# Patient Record
Sex: FEMALE | Race: WHITE | Employment: OTHER | URBAN - METROPOLITAN AREA
[De-identification: names, ages, dates, MRNs, and addresses within clinical notes are randomized per-mention and may not be internally consistent; named-entity substitution may affect disease eponyms.]

---

## 2017-09-26 ENCOUNTER — TRANSFERRED RECORDS (OUTPATIENT)
Dept: HEALTH INFORMATION MANAGEMENT | Facility: CLINIC | Age: 82
End: 2017-09-26

## 2017-10-10 ENCOUNTER — TRANSFERRED RECORDS (OUTPATIENT)
Dept: HEALTH INFORMATION MANAGEMENT | Facility: CLINIC | Age: 82
End: 2017-10-10

## 2017-11-21 ENCOUNTER — TRANSFERRED RECORDS (OUTPATIENT)
Dept: HEALTH INFORMATION MANAGEMENT | Facility: CLINIC | Age: 82
End: 2017-11-21

## 2018-08-21 ENCOUNTER — TRANSFERRED RECORDS (OUTPATIENT)
Dept: HEALTH INFORMATION MANAGEMENT | Facility: CLINIC | Age: 83
End: 2018-08-21

## 2018-09-11 NOTE — TELEPHONE ENCOUNTER
FUTURE VISIT INFORMATION      FUTURE VISIT INFORMATION:    Date: 9/17/18    Time: 1330    Location: ORTHO  REFERRAL INFORMATION:    Referring provider:  N/A    Referring providers clinic:  N/A    Reason for visit/diagnosis  KNEE PAIN    RECORDS REQUESTED FROM:       Clinic name Comments Records Status Imaging Status                                         RECORDS STATUS      LVM WITH PATIENT TO DISCUSS RECORDS 9/10/18

## 2018-09-12 NOTE — TELEPHONE ENCOUNTER
RECORDS RECEIVED FROM: [facility]   DATE RECEIVED: 9/12/18   NOTES STATUS DETAILS   OFFICE NOTE from referring provider N/A    OFFICE NOTE from other specialist Received 10/1/17*2/18/18*2/27/18*8/24/18*   DISCHARGE SUMMARY from hospital N/A    DISCHARGE REPORT from the ER N/A    OPERATIVE REPORT N/A    MEDICATION LIST Received    IMPLANT RECORD/STICKER N/A    LABS     CBC/DIFF N/A    CULTURES N/A    INJECTIONS DONE IN RADIOLOGY N/A    MRI N/A    CT SCAN N/A    XRAYS (IMAGES & REPORTS) Received 9/26/17*   TUMOR     PATHOLOGY  Slides & report N/A

## 2018-09-17 ENCOUNTER — RADIANT APPOINTMENT (OUTPATIENT)
Dept: GENERAL RADIOLOGY | Facility: CLINIC | Age: 83
End: 2018-09-17
Attending: ORTHOPAEDIC SURGERY
Payer: MEDICARE

## 2018-09-17 ENCOUNTER — HOSPITAL ENCOUNTER (INPATIENT)
Facility: CLINIC | Age: 83
Setting detail: SURGERY ADMIT
End: 2018-09-17
Attending: ORTHOPAEDIC SURGERY | Admitting: ORTHOPAEDIC SURGERY
Payer: MEDICARE

## 2018-09-17 ENCOUNTER — OFFICE VISIT (OUTPATIENT)
Dept: ORTHOPEDICS | Facility: CLINIC | Age: 83
End: 2018-09-17
Payer: MEDICARE

## 2018-09-17 ENCOUNTER — PRE VISIT (OUTPATIENT)
Dept: ORTHOPEDICS | Facility: CLINIC | Age: 83
End: 2018-09-17

## 2018-09-17 VITALS — WEIGHT: 146.7 LBS | HEIGHT: 62 IN | BODY MASS INDEX: 27 KG/M2

## 2018-09-17 DIAGNOSIS — M25.561 PAIN IN BOTH KNEES, UNSPECIFIED CHRONICITY: ICD-10-CM

## 2018-09-17 DIAGNOSIS — M25.562 BILATERAL KNEE PAIN: ICD-10-CM

## 2018-09-17 DIAGNOSIS — M25.561 BILATERAL KNEE PAIN: ICD-10-CM

## 2018-09-17 DIAGNOSIS — Z96.641 STATUS POST RIGHT HIP REPLACEMENT: Primary | ICD-10-CM

## 2018-09-17 DIAGNOSIS — Z96.641 STATUS POST RIGHT HIP REPLACEMENT: ICD-10-CM

## 2018-09-17 DIAGNOSIS — M25.562 PAIN IN BOTH KNEES, UNSPECIFIED CHRONICITY: ICD-10-CM

## 2018-09-17 PROBLEM — R73.9 ELEVATED BLOOD SUGAR: Status: ACTIVE | Noted: 2018-09-17

## 2018-09-17 PROBLEM — J30.2 SEASONAL ALLERGIES: Status: ACTIVE | Noted: 2018-09-17

## 2018-09-17 LAB
ABO + RH BLD: NORMAL
ABO + RH BLD: NORMAL
BLD GP AB SCN SERPL QL: NORMAL
BLOOD BANK CMNT PATIENT-IMP: NORMAL
SPECIMEN EXP DATE BLD: NORMAL

## 2018-09-17 RX ORDER — UBIDECARENONE 50 MG
1 CAPSULE ORAL
Status: ON HOLD | COMMUNITY
End: 2018-12-02

## 2018-09-17 RX ORDER — AMLODIPINE BESYLATE 5 MG/1
5 TABLET ORAL
COMMUNITY
Start: 2018-02-14

## 2018-09-17 RX ORDER — NIACIN 250 MG
TABLET ORAL
COMMUNITY

## 2018-09-17 RX ORDER — LATANOPROST 50 UG/ML
SOLUTION/ DROPS OPHTHALMIC
COMMUNITY
Start: 2017-02-16

## 2018-09-17 RX ORDER — TRAVOPROST OPHTHALMIC SOLUTION 0.04 MG/ML
SOLUTION OPHTHALMIC
COMMUNITY
Start: 2016-09-08

## 2018-09-17 RX ORDER — PHENOL 1.4 %
AEROSOL, SPRAY (ML) MUCOUS MEMBRANE
COMMUNITY

## 2018-09-17 RX ORDER — CETIRIZINE HYDROCHLORIDE 10 MG/1
10 TABLET ORAL
COMMUNITY

## 2018-09-17 ASSESSMENT — ACTIVITIES OF DAILY LIVING (ADL): FUNCTION,_DAILY_LIVING_SCORE: 39.7

## 2018-09-17 ASSESSMENT — ENCOUNTER SYMPTOMS
BACK PAIN: 0
DECREASED APPETITE: 0
SINUS PAIN: 0
MUSCLE WEAKNESS: 1
SORE THROAT: 0
CHILLS: 0
DEPRESSION: 0
FLANK PAIN: 0
NECK MASS: 0
NIGHT SWEATS: 0
SINUS CONGESTION: 1
FATIGUE: 0
MUSCLE CRAMPS: 0
WEIGHT LOSS: 0
POLYDIPSIA: 0
HYPERTENSION: 1
STIFFNESS: 1
NERVOUS/ANXIOUS: 1
PANIC: 0
INSOMNIA: 1
WEIGHT GAIN: 0
SMELL DISTURBANCE: 0
MYALGIAS: 0
DYSURIA: 0
LEG PAIN: 1
TROUBLE SWALLOWING: 0
EXERCISE INTOLERANCE: 1
PALPITATIONS: 0
DIFFICULTY URINATING: 0
SLEEP DISTURBANCES DUE TO BREATHING: 0
DECREASED CONCENTRATION: 0
FEVER: 0
HALLUCINATIONS: 0
ARTHRALGIAS: 1
NECK PAIN: 0
INCREASED ENERGY: 0
ORTHOPNEA: 0
HEMATURIA: 0
HOARSE VOICE: 0
LIGHT-HEADEDNESS: 0
POLYPHAGIA: 0
SYNCOPE: 0
TASTE DISTURBANCE: 0
ALTERED TEMPERATURE REGULATION: 0
HYPOTENSION: 0
JOINT SWELLING: 1

## 2018-09-17 ASSESSMENT — KOOS JR
HOW SEVERE IS YOUR KNEE STIFFNESS AFTER FIRST WAKING IN MORNING: 1
HOW SEVERE IS YOUR KNEE STIFFNESS AFTER FIRST WAKING IN MORNING: MODERATE

## 2018-09-17 NOTE — PROGRESS NOTES
[facility]  Orthopaedic Surgery, Joint Replacement Consultation  by Beto Jenkins M.D.    Luis Roe MRN# [id number]   Age: 87 year old YOB: 1931     Requesting physician: Los Lambert     Background history:   Dx:  1. 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 s/p prior pelvic acetabular osteotomy reconstruction  3. Bilateral knee degenerative changes  Treatments:   1. 10/30/12 Right total hip arthroplasty & reconstruction of pelvis with femoral autogenous graft using internal fixation (Gregory)  [employer] Trident Tritanium cup 60mm, Size F elevated 36mm liner, Cemented Omnifit femoral stem size 5, 36mm -5 CoCr head  2. 2/2018: right knee injection (several months of relief)  3. 8/2018: left knee injection (minimal relief)         Assessment and Plan:   Assessment:  87 year old female with advanced bilateral knee degenerative changes     Plan:    Discussed bilateral knee degenerative changes with patient; discussed management options including activity modification, anti-inflammatories, injection, and surgical management including total knee arthroplasty.  Patient is failed nonoperative measures and has significant restrictions in her daily activities    Discussed risks and benefits of surgical intervention; risks of total knee arthroplasty include but not limited to infection, blood loss, persistent pain, stiffness, need for further operation, fractures; patient knowledges these risks and wishes to proceed with surgery    Educated patient on the need for home exercises and physical therapy to work on range of motion following surgery    Patient will see her primary care physician to obtain preoperative clearance    Patient to see anesthesia for preoperative evaluation    We will arrange for patient to attend preoperative total joint arthroplasty course    Advised patient to see her dentist and have any dental work completed prior to  undergoing total knee replacement; patient states she has an appointment with her dentist in 3 weeks    We will schedule Ms. Roe for a left total knee arthroplasty pending completion of the above preoperative plan           History of Present Illness:   Ms. Roe is an 87 year old female returning to the office for evaluation of bilateral knee pain; left worse than right.  Patient was previously treated with a right total hip arthroplasty with pelvis reconstruction using autograft bone on 10/30/2012.   Patient has had progressive bilateral knee pain over the last 2-3 years.  She has tried injections in both knees; she got some relief from an injection in the right knee but minimal relief from the injection in the left knee.  Patient states that she has gotten to the point where her daily activity is limited and she is missing regular activities secondary to her knee pain.    Ms. Roe states that her right hip is doing very well since her PENNIE in 2012.    Current symptoms:  Problem: bilateral knee pain  Onset and duration: 2-3 years  Precipitating Injury:  No    Other joints or sites painful:  No         Physical Exam:   Gen: no acute distress  ABD: benign   SKIN: grossly normal   LYMPHATIC: grossly normal   NEURO: grossly normal   VASCULAR: satisfactory perfusion of all extremities   RLE: well healed hip incision, valgus alignment, partially correctable    Knee ROM 0-110 degrees    Thigh and leg compartments soft and compressible    +Quad/TA/GSC/EHL/FHL    SILT DP/SP/albino/Saph/Tibial nerve distributions    Palpable dorsalis pedis pulse  LLE:    Knee ROM 0-110 degrees, varus alignment    Thigh and leg compartments soft and compressible    +Quad/TA/GSC/EHL/FHL    SILT DP/SP/albino/Saph/Tibial nerve distributions    Palpable dorsalis pedis pulse         Data:   Bilateral knee x-rays: Advanced degenerative changes; valgus alignment of right knee with most significant degenerative changes in the lateral  compartment; varus alignment of left knee with significant degenerative changes in the medial compartment.    Pelvis x-rays: Postoperative changes, total hip arthroplasty in place, screw fixation of femoral head bone grafting in place.    Ciro Wilkes MD  Orthopaedic Surgery, Adult Reconstruction Fellow  Pager [phone]    DATA for DOCUMENTATION:         Past Medical History:     Patient Active Problem List   Diagnosis     CDH (congenital dislocation of the hip)     Osteoarthritis of hip     DDH (developmental dysplasia of the hip)     Osteoporosis     Moderate major depression (H)     Hyperlipidemia     Embolism (H)     S/P hip replacement     Past Medical History:   Diagnosis Date     Hyperlipidemia      Osteoporosis      Also see scanned health assessment forms.       Past Surgical History:     Past Surgical History:   Procedure Laterality Date     ARTHROPLASTY HIP  10/30/2012    Procedure: ARTHROPLASTY HIP;  Right Total Hip Replacement;  Surgeon: Beto Jenkins MD;  Location: [facility] OR     HIP SURGERY       HYSTERECTOMY       REPAIR BLADDER       TONSILLECTOMY            Social History:     Social History     Social History     Marital status:      Spouse name: N/A     Number of children: N/A     Years of education: N/A     Occupational History     Not on file.     Social History Main Topics     Smoking status: Former Smoker     Smokeless tobacco: Not on file      Comment: smoked as a teenager     Alcohol use No     Drug use: No     Sexual activity: Not on file     Other Topics Concern     Not on file     Social History Narrative     No narrative on file          Family History:     No family history on file.         Medications:     Current Outpatient Prescriptions   Medication Sig     acetaminophen (TYLENOL) 325 MG tablet Take 1-2 tablets by mouth every 4 hours as needed.     albuterol (PROVENTIL HFA: VENTOLIN HFA) 108 (90 BASE) MCG/ACT inhaler Inhale 2 puffs into the lungs 4 times daily as needed  (dyspnea).     amoxicillin (AMOXIL) 500 MG capsule Take 4 capsules one hour prior to dental work.     amoxicillin (AMOXIL) 500 MG tablet Amoxicillin 2 grams (4 tablets) one half hour before dental visit.     Calcium Carbonate (CALCIUM 600 PO)      fexofenadine (ALLEGRA) 180 MG tablet Take 1 tablet by mouth daily.     Multiple Vitamin (DAILY MULTIVITAMIN PO)      PARoxetine (PAXIL) 10 MG tablet Take 1 tablet by mouth every morning.     No current facility-administered medications for this visit.           Review of Systems:   A comprehensive 10 point review of systems (constitutional, ENT, cardiac, peripheral vascular, lymphatic, respiratory, GI, , Musculoskeletal, skin, Neurological) was performed and found to be negative except as described in this note.     See intake form completed by patient  Answers for HPI/ROS submitted by the patient on 9/17/2018   General Symptoms: Yes  Skin Symptoms: No  HENT Symptoms: Yes  EYE SYMPTOMS: No  HEART SYMPTOMS: Yes  LUNG SYMPTOMS: No  INTESTINAL SYMPTOMS: No  URINARY SYMPTOMS: Yes  GYNECOLOGIC SYMPTOMS: No  BREAST SYMPTOMS: No  SKELETAL SYMPTOMS: Yes  BLOOD SYMPTOMS: No  NERVOUS SYSTEM SYMPTOMS: No  MENTAL HEALTH SYMPTOMS: Yes  Fever: No  Loss of appetite: No  Weight loss: No  Weight gain: No  Fatigue: No  Night sweats: No  Chills: No  Increased stress: Yes  Excessive hunger: No  Excessive thirst: No  Feeling hot or cold when others believe the temperature is normal: No  Loss of height: No  Post-operative complications: No  Surgical site pain: No  Hallucinations: No  Change in or Loss of Energy: No  Hyperactivity: No  Confusion: No  Ear pain: No  Ear discharge: No  Hearing loss: Yes  Tinnitus: No  Nosebleeds: No  Congestion: Yes  Sinus pain: No  Trouble swallowing: No   Voice hoarseness: No  Mouth sores: No  Sore throat: No  Tooth pain: No  Gum tenderness: No  Bleeding gums: No  Change in taste: No  Change in sense of smell: No  Dry mouth: Yes  Hearing aid used: No  Neck  lump: No  Chest pain or pressure: No  Fast or irregular heartbeat: No  Pain in legs with walking: Yes  Trouble breathing while lying down: No  Fingers or toes appear blue: No  High blood pressure: Yes  Low blood pressure: No  Fainting: No  Murmurs: No  Pacemaker: No  Varicose veins: Yes  Edema or swelling: Yes  Wake up at night with shortness of breath: No  Light-headedness: No  Exercise intolerance: Yes  Trouble holding urine or incontinence: Yes  Pain or burning: No  Trouble starting or stopping: No  Increased frequency of urination: Yes  Blood in urine: No  Decreased frequency of urination: No  Frequent nighttime urination: No  Flank pain: No  Difficulty emptying bladder: No  Back pain: No  Muscle aches: No  Neck pain: No  Swollen joints: Yes  Joint pain: Yes  Bone pain: No  Muscle cramps: No  Muscle weakness: Yes  Joint stiffness: Yes  Bone fracture: No  Nervous or Anxious: Yes  Depression: No  Trouble sleeping: Yes  Trouble thinking or concentrating: No  Mood changes: No  Panic attacks: No    Attending MD (Dr. Beto Jenkins) Attestation :  This patient was seen and evaluated by me including a history, exam, and interpretation of all imaging and/or lab data.  Either a training physician (resident/fellow), who also saw the patient, or scribe has documented the clinic visit in the attached note.    Beto Jenkins MD  [employer]  Oncology and Adult Reconstructive Surgery  Dept Orthopaedic Surgery, [facility]  [phone] office, [phone] pager  [email or web address]

## 2018-09-17 NOTE — LETTER
9/17/2018       RE: Luis Roe  [address]  [place] [address]     Dear Colleague,    Thank you for referring your patient, Luis Roe, to the [facility]. Please see a copy of my visit note below.        [facility]  Orthopaedic Surgery, Joint Replacement Consultation  by Beto Jenkins M.D.    Luis Roe MRN# [id number]   Age: 87 year old YOB: 1931     Requesting physician: Los Lambert     Background history:   Dx:  1. 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 s/p prior pelvic acetabular osteotomy reconstruction  3. Bilateral knee degenerative changes  Treatments:   1. 10/30/12 Right total hip arthroplasty & reconstruction of pelvis with femoral autogenous graft using internal fixation (Gregory)  [employer] Trident Tritanium cup 60mm, Size F elevated 36mm liner, Cemented Omnifit femoral stem size 5, 36mm -5 CoCr head  2. 2/2018: right knee injection (several months of relief)  3. 8/2018: left knee injection (minimal relief)         Assessment and Plan:   Assessment:  87 year old female with advanced bilateral knee degenerative changes     Plan:    Discussed bilateral knee degenerative changes with patient; discussed management options including activity modification, anti-inflammatories, injection, and surgical management including total knee arthroplasty.  Patient is failed nonoperative measures and has significant restrictions in her daily activities    Discussed risks and benefits of surgical intervention; risks of total knee arthroplasty include but not limited to infection, blood loss, persistent pain, stiffness, need for further operation, fractures; patient knowledges these risks and wishes to proceed with surgery    Educated patient on the need for home exercises and physical therapy to work on range of motion following surgery    Patient will see her  primary care physician to obtain preoperative clearance    Patient to see anesthesia for preoperative evaluation    We will arrange for patient to attend preoperative total joint arthroplasty course    Advised patient to see her dentist and have any dental work completed prior to undergoing total knee replacement; patient states she has an appointment with her dentist in 3 weeks    We will schedule Ms. Roe for a left total knee arthroplasty pending completion of the above preoperative plan           History of Present Illness:   Ms. Roe is an 87 year old female returning to the office for evaluation of bilateral knee pain; left worse than right.  Patient was previously treated with a right total hip arthroplasty with pelvis reconstruction using autograft bone on 10/30/2012.   Patient has had progressive bilateral knee pain over the last 2-3 years.  She has tried injections in both knees; she got some relief from an injection in the right knee but minimal relief from the injection in the left knee.  Patient states that she has gotten to the point where her daily activity is limited and she is missing regular activities secondary to her knee pain.    Ms. Roe states that her right hip is doing very well since her PENNIE in 2012.    Current symptoms:  Problem: bilateral knee pain  Onset and duration: 2-3 years  Precipitating Injury:  No    Other joints or sites painful:  No         Physical Exam:   Gen: no acute distress  ABD: benign   SKIN: grossly normal   LYMPHATIC: grossly normal   NEURO: grossly normal   VASCULAR: satisfactory perfusion of all extremities   RLE: well healed hip incision, valgus alignment, partially correctable    Knee ROM 0-110 degrees    Thigh and leg compartments soft and compressible    +Quad/TA/GSC/EHL/FHL    SILT DP/SP/albino/Saph/Tibial nerve distributions    Palpable dorsalis pedis pulse  LLE:    Knee ROM 0-110 degrees, varus alignment    Thigh and leg compartments soft and  compressible    +Quad/TA/GSC/EHL/FHL    SILT DP/SP/albino/Saph/Tibial nerve distributions    Palpable dorsalis pedis pulse         Data:   Bilateral knee x-rays: Advanced degenerative changes; valgus alignment of right knee with most significant degenerative changes in the lateral compartment; varus alignment of left knee with significant degenerative changes in the medial compartment.    Pelvis x-rays: Postoperative changes, total hip arthroplasty in place, screw fixation of femoral head bone grafting in place.    Ciro Wilkes MD  Orthopaedic Surgery, Adult Reconstruction Fellow  Pager [phone]    DATA for DOCUMENTATION:         Past Medical History:     Patient Active Problem List   Diagnosis     CDH (congenital dislocation of the hip)     Osteoarthritis of hip     DDH (developmental dysplasia of the hip)     Osteoporosis     Moderate major depression (H)     Hyperlipidemia     Embolism (H)     S/P hip replacement     Past Medical History:   Diagnosis Date     Hyperlipidemia      Osteoporosis      Also see scanned health assessment forms.       Past Surgical History:     Past Surgical History:   Procedure Laterality Date     ARTHROPLASTY HIP  10/30/2012    Procedure: ARTHROPLASTY HIP;  Right Total Hip Replacement;  Surgeon: Beto Jenkins MD;  Location: [facility] OR     HIP SURGERY       HYSTERECTOMY       REPAIR BLADDER       TONSILLECTOMY            Social History:     Social History     Social History     Marital status:      Spouse name: N/A     Number of children: N/A     Years of education: N/A     Occupational History     Not on file.     Social History Main Topics     Smoking status: Former Smoker     Smokeless tobacco: Not on file      Comment: smoked as a teenager     Alcohol use No     Drug use: No     Sexual activity: Not on file     Other Topics Concern     Not on file     Social History Narrative     No narrative on file          Family History:     No family history on file.         Medications:      Current Outpatient Prescriptions   Medication Sig     acetaminophen (TYLENOL) 325 MG tablet Take 1-2 tablets by mouth every 4 hours as needed.     albuterol (PROVENTIL HFA: VENTOLIN HFA) 108 (90 BASE) MCG/ACT inhaler Inhale 2 puffs into the lungs 4 times daily as needed (dyspnea).     amoxicillin (AMOXIL) 500 MG capsule Take 4 capsules one hour prior to dental work.     amoxicillin (AMOXIL) 500 MG tablet Amoxicillin 2 grams (4 tablets) one half hour before dental visit.     Calcium Carbonate (CALCIUM 600 PO)      fexofenadine (ALLEGRA) 180 MG tablet Take 1 tablet by mouth daily.     Multiple Vitamin (DAILY MULTIVITAMIN PO)      PARoxetine (PAXIL) 10 MG tablet Take 1 tablet by mouth every morning.     No current facility-administered medications for this visit.           Review of Systems:   A comprehensive 10 point review of systems (constitutional, ENT, cardiac, peripheral vascular, lymphatic, respiratory, GI, , Musculoskeletal, skin, Neurological) was performed and found to be negative except as described in this note.     See intake form completed by patient  Answers for HPI/ROS submitted by the patient on 9/17/2018   General Symptoms: Yes  Skin Symptoms: No  HENT Symptoms: Yes  EYE SYMPTOMS: No  HEART SYMPTOMS: Yes  LUNG SYMPTOMS: No  INTESTINAL SYMPTOMS: No  URINARY SYMPTOMS: Yes  GYNECOLOGIC SYMPTOMS: No  BREAST SYMPTOMS: No  SKELETAL SYMPTOMS: Yes  BLOOD SYMPTOMS: No  NERVOUS SYSTEM SYMPTOMS: No  MENTAL HEALTH SYMPTOMS: Yes  Fever: No  Loss of appetite: No  Weight loss: No  Weight gain: No  Fatigue: No  Night sweats: No  Chills: No  Increased stress: Yes  Excessive hunger: No  Excessive thirst: No  Feeling hot or cold when others believe the temperature is normal: No  Loss of height: No  Post-operative complications: No  Surgical site pain: No  Hallucinations: No  Change in or Loss of Energy: No  Hyperactivity: No  Confusion: No  Ear pain: No  Ear discharge: No  Hearing loss: Yes  Tinnitus:  No  Nosebleeds: No  Congestion: Yes  Sinus pain: No  Trouble swallowing: No   Voice hoarseness: No  Mouth sores: No  Sore throat: No  Tooth pain: No  Gum tenderness: No  Bleeding gums: No  Change in taste: No  Change in sense of smell: No  Dry mouth: Yes  Hearing aid used: No  Neck lump: No  Chest pain or pressure: No  Fast or irregular heartbeat: No  Pain in legs with walking: Yes  Trouble breathing while lying down: No  Fingers or toes appear blue: No  High blood pressure: Yes  Low blood pressure: No  Fainting: No  Murmurs: No  Pacemaker: No  Varicose veins: Yes  Edema or swelling: Yes  Wake up at night with shortness of breath: No  Light-headedness: No  Exercise intolerance: Yes  Trouble holding urine or incontinence: Yes  Pain or burning: No  Trouble starting or stopping: No  Increased frequency of urination: Yes  Blood in urine: No  Decreased frequency of urination: No  Frequent nighttime urination: No  Flank pain: No  Difficulty emptying bladder: No  Back pain: No  Muscle aches: No  Neck pain: No  Swollen joints: Yes  Joint pain: Yes  Bone pain: No  Muscle cramps: No  Muscle weakness: Yes  Joint stiffness: Yes  Bone fracture: No  Nervous or Anxious: Yes  Depression: No  Trouble sleeping: Yes  Trouble thinking or concentrating: No  Mood changes: No  Panic attacks: No    Attending MD (Dr. Beto Jenkins) Attestation :  This patient was seen and evaluated by me including a history, exam, and interpretation of all imaging and/or lab data.  Either a training physician (resident/fellow), who also saw the patient, or scribe has documented the clinic visit in the attached note.    Beto Jenkins MD  [employer]  Oncology and Adult Reconstructive Surgery  Dept Orthopaedic Surgery, [facility]  [phone] office, [phone] pager  [email or web address]

## 2018-09-17 NOTE — NURSING NOTE
"Chief Complaint   Patient presents with     Consult     Pt. states that she is returning today for Left Knee Pain.        87 year old  8/7/1931    Ht 1.575 m (5' 2\")  Wt 66.5 kg (146 lb 11.2 oz)  BMI 26.83 kg/m2            [id number] [place], [place] - [address] ([facility] [place], [place] - [address]    Allergies   Allergen Reactions     Ace Inhibitors      Other reaction(s): Cough     No Clinical Screening - See Comments      Dust and mold: Nasal      Current Outpatient Prescriptions   Medication     amLODIPine (NORVASC) 5 MG tablet     ascorbic acid 1000 MG TABS tablet     Boswellia Jp (BOSWELLIA PO)     calcium carbonate (OS-ANGELINA 600 MG [place]. CA) 600 MG tablet     cetirizine (RA CETIRIZINE) 10 MG tablet     cholecalciferol (VITAMIN D-1000 MAX ST) 1000 units TABS     IBUPROFEN PO     latanoprost (XALATAN) 0.005 % ophthalmic solution     Multiple Vitamin (DAILY MULTIVITAMIN PO)     niacin 250 MG tablet     Omega-3 Fatty Acids (FISH OIL PO)     Red Yeast Rice 600 MG TABS     travoprost, FRANCISCO Free, (TRAVATAN Z) 0.004 % ophthalmic solution     amoxicillin (AMOXIL) 500 MG capsule     No current facility-administered medications for this visit.          Questionnaires:      KOOS Knee Survey Assessment    Knee Outcome Survey ADL Scale (Prudencio, SHAGUFTA; Diana, L; Pratibha, RS; Santino, FH; Isai, JUAN; 1998) 9/17/2018   Do you have swelling in your knee? Always   Do you feel grinding, hear clicking or any other type of noise when your knee moves? Sometimes   Does your knee catch or hang up when moving? Never   Can you straighten your knee fully? Often   Can you bend your knee fully? Sometimes   How severe is your knee joint stiffness after first wakening in the morning? Moderate   How severe is your knee stiffness after sitting, lying or resting LATER IN THE DAY? Moderate   How often do you experience knee pain? Daily "   Twisting/pivoting on your knee Severe   Straightening knee fully Moderate   Bending knee fully Severe   Walking on flat surface Moderate   Going up or down stairs Severe   At night while in bed Mild   Sitting or lying Mild   Standing upright Severe   Descending stairs Severe   Ascending stairs Moderate   Rising from sitting Moderate   Standing Severe   Bending to floor/ an object Moderate   Walking on flat surface Moderate   Getting in/out of car Severe   Going shopping Moderate   Putting on socks/stockings Severe   Rising from bed Moderate   Taking off socks/stockings Moderate   Lying in bed (turning over, maintaining knee position) Moderate   Getting in/out of bath Severe   Sitting Moderate   Getting on/off toilet Moderate   Heavy domestic duties (moving heavy boxes, scrubbing floors, etc) Extreme   Light domestic duties (cooking, dusting, etc) Moderate   Squatting Severe   Running Extreme   Jumping Extreme   Twisting/pivoting on your injured knee Extreme   Kneeling Extreme   How often are you aware of your knee problem? Constantly   Have you modified your life style to avoid potentially damaging activities to your knee? Totally   How much are you troubled with lack of confidence in your knee? Totally   In general, how much difficulty do you have with your knee? Severe   Pain Score 41.66   Symptoms Score 46.42   Function, Daily Living Score 39.7   Sports and Rec Score 5   Quality of Life Score 6.25            Promis 10 Assessment    PROMIS 10 9/17/2018   In general, would you say your health is: Good   In general, would you say your quality of life is: Fair   In general, how would you rate your physical health? Good   In general, how would you rate your mental health, including your mood and your ability to think? Very good   In general, how would you rate your satisfaction with your social activities and relationships? Good   In general, please rate how well you carry out your usual social activities and  roles Good   To what extent are you able to carry out your everyday physical activities such as walking, climbing stairs, carrying groceries, or moving a chair? Mostly   How often have you been bothered by emotional problems such as feeling anxious, depressed or irritable? Rarely   How would you rate your fatigue on average? Moderate   How would you rate your pain on average?   0 = No Pain  to  10 = Worst Imaginable Pain 5   In general, would you say your health is: 3   In general, would you say your quality of life is: 2   In general, how would you rate your physical health? 3   In general, how would you rate your mental health, including your mood and your ability to think? 4   In general, how would you rate your satisfaction with your social activities and relationships? 3   In general, please rate how well you carry out your usual social activities and roles. (This includes activities at home, at work and in your community, and responsibilities as a parent, child, spouse, employee, friend, etc.) 3   To what extent are you able to carry out your everyday physical activities such as walking, climbing stairs, carrying groceries, or moving a chair? 4   In the past 7 days, how often have you been bothered by emotional problems such as feeling anxious, depressed, or irritable? 2   In the past 7 days, how would you rate your fatigue on average? 3   In the past 7 days, how would you rate your pain on average, where 0 means no pain, and 10 means worst imaginable pain? 5   Global Mental Health Score 13   Global Physical Health Score 13   PROMIS TOTAL - SUBSCORES 26   Some recent data might be hidden

## 2018-09-17 NOTE — LETTER
9/17/2018      RE: Luis Roe  [address] [facility] [place] [facility] [address]           [facility]  Orthopaedic Surgery, Joint Replacement Consultation  by Beto Jenkins M.D.    Luis Roe MRN# [id number]   Age: 87 year old YOB: 1931     Requesting physician: Los Lambert     Background history:   Dx:  1. 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 s/p prior pelvic acetabular osteotomy reconstruction  3. Bilateral knee degenerative changes  Treatments:   1. 10/30/12 Right total hip arthroplasty & reconstruction of pelvis with femoral autogenous graft using internal fixation (Gregory)  [employer] Trident Tritanium cup 60mm, Size F elevated 36mm liner, Cemented Omnifit femoral stem size 5, 36mm -5 CoCr head  2. 2/2018: right knee injection (several months of relief)  3. 8/2018: left knee injection (minimal relief)         Assessment and Plan:   Assessment:  87 year old female with advanced bilateral knee degenerative changes     Plan:    Discussed bilateral knee degenerative changes with patient; discussed management options including activity modification, anti-inflammatories, injection, and surgical management including total knee arthroplasty.  Patient is failed nonoperative measures and has significant restrictions in her daily activities    Discussed risks and benefits of surgical intervention; risks of total knee arthroplasty include but not limited to infection, blood loss, persistent pain, stiffness, need for further operation, fractures; patient knowledges these risks and wishes to proceed with surgery    Educated patient on the need for home exercises and physical therapy to work on range of motion following surgery    Patient will see her primary care physician to obtain preoperative clearance    Patient to see anesthesia for preoperative evaluation    We will arrange for patient to attend preoperative total joint arthroplasty  course    Advised patient to see her dentist and have any dental work completed prior to undergoing total knee replacement; patient states she has an appointment with her dentist in 3 weeks    We will schedule Ms. Roe for a left total knee arthroplasty pending completion of the above preoperative plan           History of Present Illness:   Ms. Roe is an 87 year old female returning to the office for evaluation of bilateral knee pain; left worse than right.  Patient was previously treated with a right total hip arthroplasty with pelvis reconstruction using autograft bone on 10/30/2012.   Patient has had progressive bilateral knee pain over the last 2-3 years.  She has tried injections in both knees; she got some relief from an injection in the right knee but minimal relief from the injection in the left knee.  Patient states that she has gotten to the point where her daily activity is limited and she is missing regular activities secondary to her knee pain.    Ms. Roe states that her right hip is doing very well since her PENNIE in 2012.    Current symptoms:  Problem: bilateral knee pain  Onset and duration: 2-3 years  Precipitating Injury:  No    Other joints or sites painful:  No         Physical Exam:   Gen: no acute distress  ABD: benign   SKIN: grossly normal   LYMPHATIC: grossly normal   NEURO: grossly normal   VASCULAR: satisfactory perfusion of all extremities   RLE: well healed hip incision, valgus alignment, partially correctable    Knee ROM 0-110 degrees    Thigh and leg compartments soft and compressible    +Quad/TA/GSC/EHL/FHL    SILT DP/SP/albino/Saph/Tibial nerve distributions    Palpable dorsalis pedis pulse  LLE:    Knee ROM 0-110 degrees, varus alignment    Thigh and leg compartments soft and compressible    +Quad/TA/GSC/EHL/FHL    SILT DP/SP/albino/Saph/Tibial nerve distributions    Palpable dorsalis pedis pulse         Data:   Bilateral knee x-rays: Advanced degenerative changes; valgus  alignment of right knee with most significant degenerative changes in the lateral compartment; varus alignment of left knee with significant degenerative changes in the medial compartment.    Pelvis x-rays: Postoperative changes, total hip arthroplasty in place, screw fixation of femoral head bone grafting in place.    Ciro Wilkes MD  Orthopaedic Surgery, Adult Reconstruction Fellow  Pager [phone]    DATA for DOCUMENTATION:         Past Medical History:     Patient Active Problem List   Diagnosis     CDH (congenital dislocation of the hip)     Osteoarthritis of hip     DDH (developmental dysplasia of the hip)     Osteoporosis     Moderate major depression (H)     Hyperlipidemia     Embolism (H)     S/P hip replacement     Past Medical History:   Diagnosis Date     Hyperlipidemia      Osteoporosis      Also see scanned health assessment forms.       Past Surgical History:     Past Surgical History:   Procedure Laterality Date     ARTHROPLASTY HIP  10/30/2012    Procedure: ARTHROPLASTY HIP;  Right Total Hip Replacement;  Surgeon: Beto Jenkins MD;  Location: [facility] OR     HIP SURGERY       HYSTERECTOMY       REPAIR BLADDER       TONSILLECTOMY            Social History:     Social History     Social History     Marital status:      Spouse name: N/A     Number of children: N/A     Years of education: N/A     Occupational History     Not on file.     Social History Main Topics     Smoking status: Former Smoker     Smokeless tobacco: Not on file      Comment: smoked as a teenager     Alcohol use No     Drug use: No     Sexual activity: Not on file     Other Topics Concern     Not on file     Social History Narrative     No narrative on file          Family History:     No family history on file.         Medications:     Current Outpatient Prescriptions   Medication Sig     acetaminophen (TYLENOL) 325 MG tablet Take 1-2 tablets by mouth every 4 hours as needed.     albuterol (PROVENTIL HFA: VENTOLIN HFA) 108 (90  BASE) MCG/ACT inhaler Inhale 2 puffs into the lungs 4 times daily as needed (dyspnea).     amoxicillin (AMOXIL) 500 MG capsule Take 4 capsules one hour prior to dental work.     amoxicillin (AMOXIL) 500 MG tablet Amoxicillin 2 grams (4 tablets) one half hour before dental visit.     Calcium Carbonate (CALCIUM 600 PO)      fexofenadine (ALLEGRA) 180 MG tablet Take 1 tablet by mouth daily.     Multiple Vitamin (DAILY MULTIVITAMIN PO)      PARoxetine (PAXIL) 10 MG tablet Take 1 tablet by mouth every morning.     No current facility-administered medications for this visit.           Review of Systems:   A comprehensive 10 point review of systems (constitutional, ENT, cardiac, peripheral vascular, lymphatic, respiratory, GI, , Musculoskeletal, skin, Neurological) was performed and found to be negative except as described in this note.     See intake form completed by patient  Answers for HPI/ROS submitted by the patient on 9/17/2018   General Symptoms: Yes  Skin Symptoms: No  HENT Symptoms: Yes  EYE SYMPTOMS: No  HEART SYMPTOMS: Yes  LUNG SYMPTOMS: No  INTESTINAL SYMPTOMS: No  URINARY SYMPTOMS: Yes  GYNECOLOGIC SYMPTOMS: No  BREAST SYMPTOMS: No  SKELETAL SYMPTOMS: Yes  BLOOD SYMPTOMS: No  NERVOUS SYSTEM SYMPTOMS: No  MENTAL HEALTH SYMPTOMS: Yes  Fever: No  Loss of appetite: No  Weight loss: No  Weight gain: No  Fatigue: No  Night sweats: No  Chills: No  Increased stress: Yes  Excessive hunger: No  Excessive thirst: No  Feeling hot or cold when others believe the temperature is normal: No  Loss of height: No  Post-operative complications: No  Surgical site pain: No  Hallucinations: No  Change in or Loss of Energy: No  Hyperactivity: No  Confusion: No  Ear pain: No  Ear discharge: No  Hearing loss: Yes  Tinnitus: No  Nosebleeds: No  Congestion: Yes  Sinus pain: No  Trouble swallowing: No   Voice hoarseness: No  Mouth sores: No  Sore throat: No  Tooth pain: No  Gum tenderness: No  Bleeding gums: No  Change in taste:  No  Change in sense of smell: No  Dry mouth: Yes  Hearing aid used: No  Neck lump: No  Chest pain or pressure: No  Fast or irregular heartbeat: No  Pain in legs with walking: Yes  Trouble breathing while lying down: No  Fingers or toes appear blue: No  High blood pressure: Yes  Low blood pressure: No  Fainting: No  Murmurs: No  Pacemaker: No  Varicose veins: Yes  Edema or swelling: Yes  Wake up at night with shortness of breath: No  Light-headedness: No  Exercise intolerance: Yes  Trouble holding urine or incontinence: Yes  Pain or burning: No  Trouble starting or stopping: No  Increased frequency of urination: Yes  Blood in urine: No  Decreased frequency of urination: No  Frequent nighttime urination: No  Flank pain: No  Difficulty emptying bladder: No  Back pain: No  Muscle aches: No  Neck pain: No  Swollen joints: Yes  Joint pain: Yes  Bone pain: No  Muscle cramps: No  Muscle weakness: Yes  Joint stiffness: Yes  Bone fracture: No  Nervous or Anxious: Yes  Depression: No  Trouble sleeping: Yes  Trouble thinking or concentrating: No  Mood changes: No  Panic attacks: No    Attending MD (Dr. Beto Jenkins) Attestation :  This patient was seen and evaluated by me including a history, exam, and interpretation of all imaging and/or lab data.  Either a training physician (resident/fellow), who also saw the patient, or scribe has documented the clinic visit in the attached note.    MD Lauren Brown Professor  Oncology and Adult Reconstructive Surgery  Dept Orthopaedic Surgery, [facility]  [phone] office, [phone] pager  [email or web address]          Beto Jenkins MD

## 2018-09-17 NOTE — NURSING NOTE
Teaching Flowsheet   Relevant Diagnosis: Osteoarthritis left knee  Teaching Topic: Pre-op:  Left total knee replacement     Person(s) involved in teaching:   Patient and Daughter     Motivation Level:  Asks Questions: Yes  Eager to Learn: Yes  Cooperative: Yes  Receptive (willing/able to accept information): Yes  Any cultural factors/Lutheran beliefs that may influence understanding or compliance? NA       Patient and Family demonstrates understanding of the following:  Reason for the appointment, diagnosis and treatment plan: Yes  Knowledge of proper use of medications and conditions for which they are ordered (with special attention to potential side effects or drug interactions): Yes  Which situations necessitate calling provider and whom to contact: Yes       Teaching Concerns Addressed:        Proper use and care of  (medical equip, care aids, etc.): NA  Nutritional needs and diet plan: Yes  Pain management techniques: Yes  Wound Care: Yes  How and/when to access community resources: NA     Instructional Materials Used/Given: Surgery folder     Time spent with patient: 15 minutes.

## 2018-09-17 NOTE — MR AVS SNAPSHOT
"              After Visit Summary   2018    Luis Roe    MRN: [id number]           Patient Information     Date Of Birth          1931        Visit Information        Provider Department      2018 1:30 PM Beto Jenkins MD [facility]        Today's Diagnoses     Status post right hip replacement    -  1    Pain in both knees, unspecified chronicity           Follow-ups after your visit        Your next 10 appointments already scheduled     Oct 26, 2018   Procedure with Beto Jenkins MD   [facility], [place], Same Day Surgery (--)    [address] [facility] [facility] [id number] [phone]              Who to contact     Please call your clinic at [phone] to:    Ask questions about your health    Make or cancel appointments    Discuss your medicines    Learn about your test results    Speak to your doctor            Additional Information About Your Visit        MyChart Information     [employer] is an electronic gateway that provides easy, online access to your medical records. With [employer], you can request a clinic appointment, read your test results, renew a prescription or communicate with your care team.     To sign up for [employer] visit the website at [email or web address]   You will be asked to enter the access code listed below, as well as some personal information. Please follow the directions to create your username and password.     Your access code is: [email or web address]  Expires: 2018  6:31 AM     Your access code will  in 90 days. If you need help or a new code, please contact your [facility] or call [phone] for assistance.        Care EveryWhere ID     This is your Care EveryWhere ID. This could be used by other organizations to access your [place] medical records  [id number]        Your Vitals Were     Height BMI (Body Mass Index)                1.575 m (5' 2\") 26.83 kg/m2           Blood Pressure from Last 3 Encounters: "   11/15/12 111/72   11/04/12 133/70    Weight from Last 3 Encounters:   09/17/18 66.5 kg (146 lb 11.2 oz)   08/01/13 65.8 kg (145 lb)   02/07/13 62.1 kg (137 lb)              We Performed the Following     Mouna-Operative Worksheet (Tumor/Adult Reconstruction: Knee/Hip/Fracture )     XR Pelvis 3: Octavio (2 obli), AP MD Read Charge          Today's Medication Changes          These changes are accurate as of 9/17/18  5:30 PM.  If you have any questions, ask your nurse or doctor.               These medicines have changed or have updated prescriptions.        Dose/Directions    amoxicillin 500 MG capsule   Commonly known as:  AMOXIL   This may have changed:  Another medication with the same name was removed. Continue taking this medication, and follow the directions you see here.   Used for:  OA (osteoarthritis) of hip   Changed by:  Beto Jenkins MD        Take 4 capsules one hour prior to dental work.   Quantity:  4 capsule   Refills:  1       calcium carbonate 600 MG tablet   Commonly known as:  OS-ANGELINA 600 mg [place]. Ca   This may have changed:  Another medication with the same name was removed. Continue taking this medication, and follow the directions you see here.   Changed by:  Beto Jenkins MD        Take 1,200 mg by mouth 2 times daily.   Refills:  0         Stop taking these medicines if you haven't already. Please contact your care team if you have questions.     acetaminophen 325 MG tablet   Commonly known as:  TYLENOL   Stopped by:  Beto Jenkins MD           albuterol 108 (90 Base) MCG/ACT inhaler   Commonly known as:  PROAIR HFA/PROVENTIL HFA/VENTOLIN HFA   Stopped by:  Beto Jenkins MD           fexofenadine 180 MG tablet   Commonly known as:  ALLEGRA   Stopped by:  Beto Jenkins MD           PAXIL 10 MG tablet   Generic drug:  PARoxetine   Stopped by:  Beto Jenkins MD                    Primary Care Provider Office Phone # Fax #    Los Lambert PA-C [phone]        [facility] [address]  [facility] [address]        Equal Access to Services     ALANNAH CHAVEZ : Hadii ronen bassett hadalainao Sokeciaali, waaxda luqadaha, qaybta kaalmada adefrances, waxlamberto chris betzaidarocael jamesonkodi zee merlin pittman. So [facility] [phone].    ATENCIÓN: Si habla español, tiene a piper disposición servicios gratuitos de asistencia lingüística. Candidaame al [phone].    We comply with applicable federal civil rights laws and [place] laws. We do not discriminate on the basis of race, color, national origin, age, disability, sex, sexual orientation, or gender identity.            Thank you!     Thank you for choosing [facility]  for your care. Our goal is always to provide you with excellent care. Hearing back from our patients is one way we can continue to improve our services. Please take a few minutes to complete the written survey that you may receive in the mail after your visit with us. Thank you!             Your Updated Medication List - Protect others around you: Learn how to safely use, store and throw away your medicines at www.disposemymeds.org.          This list is accurate as of 9/17/18  5:30 PM.  Always use your most recent med list.                   Brand Name Dispense Instructions for use Diagnosis    amLODIPine 5 MG tablet    NORVASC     Take 5 mg by mouth        amoxicillin 500 MG capsule    AMOXIL    4 capsule    Take 4 capsules one hour prior to dental work.    OA (osteoarthritis) of hip       ascorbic acid 1000 MG Tabs tablet      Take 1,000 mg by mouth once daily. Takes 2 tablets (2,000mg)   Indications: supplement        BOSWELLIA PO           calcium carbonate 600 MG tablet    OS-ANGELINA 600 mg [place]. Ca     Take 1,200 mg by mouth 2 times daily.        DAILY MULTIVITAMIN PO           FISH OIL PO      Take 1,000 mg by mouth        IBUPROFEN PO      Take 200 mg by mouth every 8 hours as needed for moderate pain        latanoprost 0.005 % ophthalmic solution    XALATAN      INSTILL 1 DROP INTO BOTH EYES QPM UTD        niacin 250 MG tablet      Take 500 mg by mouth once daily.        RA CETIRIZINE 10 MG tablet   Generic drug:  cetirizine      Take 10 mg by mouth        Red Yeast Rice 600 MG Tabs      Take 1 tablet by mouth        TRAVATAN Z 0.004 % ophthalmic solution   Generic drug:  travoprost (FRANCISCO Free)      INT 1 GTT INTO OU QPM AS DIRECTED        VITAMIN D-1000 MAX ST 1000 units Tabs   Generic drug:  cholecalciferol      Take 1,000 Units by mouth once daily. Takes 2 tablets (2,000mg)   Indications: PREVENTION OF VITAMIN D DEFICIENCY

## 2018-09-18 ENCOUNTER — TELEPHONE (OUTPATIENT)
Dept: ORTHOPEDICS | Facility: CLINIC | Age: 83
End: 2018-09-18

## 2018-09-18 ENCOUNTER — TRANSFERRED RECORDS (OUTPATIENT)
Dept: HEALTH INFORMATION MANAGEMENT | Facility: CLINIC | Age: 83
End: 2018-09-18

## 2018-09-18 ENCOUNTER — DOCUMENTATION ONLY (OUTPATIENT)
Dept: ORTHOPEDICS | Facility: CLINIC | Age: 83
End: 2018-09-18

## 2018-09-18 DIAGNOSIS — Z53.9 ERRONEOUS ENCOUNTER--DISREGARD: Primary | ICD-10-CM

## 2018-09-18 NOTE — PROGRESS NOTES
Patient is scheduled for surgery with Dr. Jenkins    Spoke or left message with: Clover Garza RN    Date of Surgery: 10/26/18    Location: [place]    Pre-op with surgeon (if applicable): Complete    H&P: Patient to schedule     Additional imaging/appointments: N/A    Surgery packet: Received in clinic    Additional comments: N/A

## 2018-09-18 NOTE — TELEPHONE ENCOUNTER
Health Call Center    Phone Message    May a detailed message be left on voicemail: yes    Reason for Call: Symptoms or Concerns     If patient has red-flag symptoms, warm transfer to triage line    Current symptom or concern:  Patient was seen today and found to have pus at [facility].   is planning to  do procedure 9/25/2018 to clean, treat and apply local antibiotics to area of concern.  She will then see patient back on 10/23/18 for 4 week postoperative appointment.  She wanted to let Dr Rodriguez know situation. Patient is aware that knee replacement may need to be delayed.  Patient has Orthopedic surgery- LEFT TOTAL KNEE REPLACEMENT with Dr Rodriguez planned for 10/26/2018       By DR RODRIGUEZ     Date: 9/17/2018    Are there any new or worsening symptoms? Yes: as above      Action Taken: Message routed to:  [facility] ([facility] Ortho/ Rodriguez

## 2018-09-27 ENCOUNTER — TELEPHONE (OUTPATIENT)
Dept: ORTHOPEDICS | Facility: CLINIC | Age: 83
End: 2018-09-27

## 2018-09-27 NOTE — TELEPHONE ENCOUNTER
Luis called to let us know that she was seen by her dentist who recommending a scaling of her gums for gingivitis and a course of oral antibiotics.  She is scheduled for a total knee replacement on 10/26/18.  She returns to her dentist on 10/23/18 and will let us know if she has been cleared by the dentist for surgery.

## 2018-10-08 ENCOUNTER — TELEPHONE (OUTPATIENT)
Dept: ORTHOPEDICS | Facility: CLINIC | Age: 83
End: 2018-10-08

## 2018-10-08 NOTE — TELEPHONE ENCOUNTER
[facility] Call Center    Phone Message    May a detailed message be left on voicemail: yes    Reason for Call: Other: Pt's daughter wants to speak to a nurse about pt's current situation and possibly rescheduing surgery.     Action Taken: Message routed to:  [facility] ([facility] Orthopedics  10/8/18 -  Luis is now being treated for a sinus infection.  She will finish the antibiotics by the end of this week.  She has also been experiencing some dizziness related to this infection.  Along with her gum infection, she is worried that she won't be able to have surgery on 10/26/18.  Her anxiety level has increased due to these concerns, and she has been re-started on Paxil by her PCP.  I told Danilela the we would only need a few day's notice before cancelling surgery.  We will see how she is doing next week and then decide whether not not to proceed.

## 2018-10-15 ENCOUNTER — TELEPHONE (OUTPATIENT)
Dept: ORTHOPEDICS | Facility: CLINIC | Age: 83
End: 2018-10-15

## 2018-10-15 RX ORDER — CEFAZOLIN SODIUM 2 G/100ML
2 INJECTION, SOLUTION INTRAVENOUS
Status: CANCELLED | OUTPATIENT
Start: 2018-10-26

## 2018-10-15 NOTE — TELEPHONE ENCOUNTER
Luis is staying with her daughter, Daniella,  Until her surgery on 10/26/18.  She is recovering from a cold an sinus infection and feels much better.  She has a pre-op H&P scheduled for next week and a folllow up dental appt.  Questions about what medications to stop taking before surgery were answered.  I told them that the pre-admissions nurses would call next with an exact arrival time for surgery

## 2018-10-17 ENCOUNTER — TEAM CONFERENCE (OUTPATIENT)
Dept: OTHER | Facility: CLINIC | Age: 83
End: 2018-10-17

## 2018-10-17 NOTE — TELEPHONE ENCOUNTER
SURGERY PLAN (PRE-OP PLAN)     Patient Position (indicated by x):  X  Supine with torso rolled up on a bump   x   Sandbag for under calf      Lateral decubitus, bean bag, full length   X  Extremity drape      Shoulder pack drape      Laparotomy drape   X  Sloan catheter   x    Regular OR table      TKA Requests (indicated by x):   X  Biomet Vanguard TKA (+/- PCL retention)      Specimens and cultures (indicated by x):      Tissue cultures, aerobic and anaerobic without gram stain      Frozen section      pathology specimens - fresh      pathology specimens - formalin            Dx:  1. 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 s/p prior pelvic acetabular osteotomy reconstruction  3. Bilateral knee degenerative changes  Treatments:   1. 10/30/12 Right total hip arthroplasty & reconstruction of pelvis with femoral autogenous graft using internal fixation (Gregory)  3Jam Trident Tritanium cup 60mm, Size F elevated 36mm liner, Cemented Omnifit femoral stem size 5, 36mm -5 CoCr head  2. 2/2018: right knee injection (several months of relief)  3. 8/2018: left knee injection (minimal relief)    Plan  Primary Left TKA with BIOMET    Ciro Wilkes MD  Orthopaedic Surgery, Adult Reconstruction Fellow  Pager [phone]

## 2018-10-25 ENCOUNTER — TELEPHONE (OUTPATIENT)
Dept: ORTHOPEDICS | Facility: CLINIC | Age: 83
End: 2018-10-25

## 2018-10-25 NOTE — TELEPHONE ENCOUNTER
Luis was her PCP yesterday and was not cleared for surgery tomorrow.  She has an upper respiratory infection that needs to clear up.  She will return to see her PCP in 2 weeks and will call us back to re-schedule.

## 2018-10-25 NOTE — TELEPHONE ENCOUNTER
Dental office called stating patient is cleared for surgery for her Dental work.  Dentist will fax notes to Clover MCCULLOUGH RN  I will pass this note on to Clover.

## 2018-10-26 ENCOUNTER — TELEPHONE (OUTPATIENT)
Dept: ORTHOPEDICS | Facility: CLINIC | Age: 83
End: 2018-10-26

## 2018-10-26 NOTE — TELEPHONE ENCOUNTER
Received call from patient requesting to reschedule her Left total knee surgery with Dr. Jenkins from 10/26/18 to 11/30/18.

## 2018-11-29 ENCOUNTER — ANESTHESIA EVENT (OUTPATIENT)
Dept: SURGERY | Facility: CLINIC | Age: 83
DRG: 470 | End: 2018-11-29
Payer: MEDICARE

## 2018-11-30 ENCOUNTER — APPOINTMENT (OUTPATIENT)
Dept: GENERAL RADIOLOGY | Facility: CLINIC | Age: 83
DRG: 470 | End: 2018-11-30
Attending: ORTHOPAEDIC SURGERY
Payer: MEDICARE

## 2018-11-30 ENCOUNTER — ANESTHESIA (OUTPATIENT)
Dept: SURGERY | Facility: CLINIC | Age: 83
DRG: 470 | End: 2018-11-30
Payer: MEDICARE

## 2018-11-30 ENCOUNTER — HOSPITAL ENCOUNTER (INPATIENT)
Facility: CLINIC | Age: 83
LOS: 3 days | Discharge: NURSING, REHAB OR LONG-TERM CARE | DRG: 470 | End: 2018-12-03
Attending: ORTHOPAEDIC SURGERY | Admitting: ORTHOPAEDIC SURGERY
Payer: MEDICARE

## 2018-11-30 ENCOUNTER — APPOINTMENT (OUTPATIENT)
Dept: ULTRASOUND IMAGING | Facility: CLINIC | Age: 83
DRG: 470 | End: 2018-11-30
Attending: INTERNAL MEDICINE
Payer: MEDICARE

## 2018-11-30 ENCOUNTER — SURGERY (OUTPATIENT)
Age: 83
End: 2018-11-30

## 2018-11-30 ENCOUNTER — APPOINTMENT (OUTPATIENT)
Dept: PHYSICAL THERAPY | Facility: CLINIC | Age: 83
DRG: 470 | End: 2018-11-30
Attending: ORTHOPAEDIC SURGERY
Payer: MEDICARE

## 2018-11-30 DIAGNOSIS — Z98.890 STATUS POST KNEE SURGERY: Primary | ICD-10-CM

## 2018-11-30 LAB
ABO + RH BLD: NORMAL
ABO + RH BLD: NORMAL
BLD GP AB SCN SERPL QL: NORMAL
BLOOD BANK CMNT PATIENT-IMP: NORMAL
GLUCOSE SERPL-MCNC: 107 MG/DL (ref 70–99)
HGB BLD-MCNC: 14.5 G/DL (ref 11.7–15.7)
SPECIMEN EXP DATE BLD: NORMAL

## 2018-11-30 PROCEDURE — 97110 THERAPEUTIC EXERCISES: CPT | Mod: GP | Performed by: PHYSICAL THERAPIST

## 2018-11-30 PROCEDURE — 25000128 H RX IP 250 OP 636: Performed by: ORTHOPAEDIC SURGERY

## 2018-11-30 PROCEDURE — 40000986 XR KNEE PORT LT 1/2 VW: Mod: LT

## 2018-11-30 PROCEDURE — 25000128 H RX IP 250 OP 636: Performed by: STUDENT IN AN ORGANIZED HEALTH CARE EDUCATION/TRAINING PROGRAM

## 2018-11-30 PROCEDURE — 71000014 ZZH RECOVERY PHASE 1 LEVEL 2 FIRST HR: Performed by: ORTHOPAEDIC SURGERY

## 2018-11-30 PROCEDURE — 25000125 ZZHC RX 250: Performed by: ORTHOPAEDIC SURGERY

## 2018-11-30 PROCEDURE — 40000985 XR KNEE PORT LT 1/2 VW: Mod: LT

## 2018-11-30 PROCEDURE — 82947 ASSAY GLUCOSE BLOOD QUANT: CPT | Performed by: ANESTHESIOLOGY

## 2018-11-30 PROCEDURE — 99207 ZZC CDG-MDM COMPONENT: MEETS LOW - DOWN CODED: CPT | Performed by: INTERNAL MEDICINE

## 2018-11-30 PROCEDURE — 12000001 ZZH R&B MED SURG/OB UMMC

## 2018-11-30 PROCEDURE — 25800025 ZZH RX 258: Performed by: ORTHOPAEDIC SURGERY

## 2018-11-30 PROCEDURE — 99231 SBSQ HOSP IP/OBS SF/LOW 25: CPT | Performed by: INTERNAL MEDICINE

## 2018-11-30 PROCEDURE — 25000128 H RX IP 250 OP 636: Performed by: NURSE ANESTHETIST, CERTIFIED REGISTERED

## 2018-11-30 PROCEDURE — 37000009 ZZH ANESTHESIA TECHNICAL FEE, EACH ADDTL 15 MIN: Performed by: ORTHOPAEDIC SURGERY

## 2018-11-30 PROCEDURE — 36000064 ZZH SURGERY LEVEL 4 EA 15 ADDTL MIN - UMMC: Performed by: ORTHOPAEDIC SURGERY

## 2018-11-30 PROCEDURE — 97530 THERAPEUTIC ACTIVITIES: CPT | Mod: GP | Performed by: PHYSICAL THERAPIST

## 2018-11-30 PROCEDURE — 25000128 H RX IP 250 OP 636: Performed by: ANESTHESIOLOGY

## 2018-11-30 PROCEDURE — A9270 NON-COVERED ITEM OR SERVICE: HCPCS | Mod: GY | Performed by: STUDENT IN AN ORGANIZED HEALTH CARE EDUCATION/TRAINING PROGRAM

## 2018-11-30 PROCEDURE — 25000132 ZZH RX MED GY IP 250 OP 250 PS 637: Mod: GY | Performed by: STUDENT IN AN ORGANIZED HEALTH CARE EDUCATION/TRAINING PROGRAM

## 2018-11-30 PROCEDURE — 86901 BLOOD TYPING SEROLOGIC RH(D): CPT | Performed by: ANESTHESIOLOGY

## 2018-11-30 PROCEDURE — 25000566 ZZH SEVOFLURANE, EA 15 MIN: Performed by: ORTHOPAEDIC SURGERY

## 2018-11-30 PROCEDURE — 25000132 ZZH RX MED GY IP 250 OP 250 PS 637: Mod: GY | Performed by: INTERNAL MEDICINE

## 2018-11-30 PROCEDURE — 27210794 ZZH OR GENERAL SUPPLY STERILE: Performed by: ORTHOPAEDIC SURGERY

## 2018-11-30 PROCEDURE — 97161 PT EVAL LOW COMPLEX 20 MIN: CPT | Mod: GP | Performed by: PHYSICAL THERAPIST

## 2018-11-30 PROCEDURE — 25000125 ZZHC RX 250: Performed by: NURSE ANESTHETIST, CERTIFIED REGISTERED

## 2018-11-30 PROCEDURE — 36000062 ZZH SURGERY LEVEL 4 1ST 30 MIN - UMMC: Performed by: ORTHOPAEDIC SURGERY

## 2018-11-30 PROCEDURE — 37000008 ZZH ANESTHESIA TECHNICAL FEE, 1ST 30 MIN: Performed by: ORTHOPAEDIC SURGERY

## 2018-11-30 PROCEDURE — 86900 BLOOD TYPING SEROLOGIC ABO: CPT | Performed by: ANESTHESIOLOGY

## 2018-11-30 PROCEDURE — A9270 NON-COVERED ITEM OR SERVICE: HCPCS | Mod: GY | Performed by: INTERNAL MEDICINE

## 2018-11-30 PROCEDURE — 85018 HEMOGLOBIN: CPT | Performed by: ANESTHESIOLOGY

## 2018-11-30 PROCEDURE — 40000193 ZZH STATISTIC PT WARD VISIT: Performed by: PHYSICAL THERAPIST

## 2018-11-30 PROCEDURE — 93970 EXTREMITY STUDY: CPT

## 2018-11-30 PROCEDURE — 40000170 ZZH STATISTIC PRE-PROCEDURE ASSESSMENT II: Performed by: ORTHOPAEDIC SURGERY

## 2018-11-30 PROCEDURE — 86850 RBC ANTIBODY SCREEN: CPT | Performed by: ANESTHESIOLOGY

## 2018-11-30 PROCEDURE — C1776 JOINT DEVICE (IMPLANTABLE): HCPCS | Performed by: ORTHOPAEDIC SURGERY

## 2018-11-30 PROCEDURE — 0SRD0J9 REPLACEMENT OF LEFT KNEE JOINT WITH SYNTHETIC SUBSTITUTE, CEMENTED, OPEN APPROACH: ICD-10-PCS | Performed by: ORTHOPAEDIC SURGERY

## 2018-11-30 PROCEDURE — 71000015 ZZH RECOVERY PHASE 1 LEVEL 2 EA ADDTL HR: Performed by: ORTHOPAEDIC SURGERY

## 2018-11-30 PROCEDURE — 27810169 ZZH OR IMPLANT GENERAL: Performed by: ORTHOPAEDIC SURGERY

## 2018-11-30 DEVICE — IMP COMP PATELLA BIOM ARCM MED 34MM 11-150842: Type: IMPLANTABLE DEVICE | Site: KNEE | Status: FUNCTIONAL

## 2018-11-30 DEVICE — IMP COMP TIBIAL KNEE ASCENT 67MM 141232: Type: IMPLANTABLE DEVICE | Site: KNEE | Status: FUNCTIONAL

## 2018-11-30 DEVICE — IMP COMP FEMORAL VANGARD BIOM PS LT 62.5MM 183126: Type: IMPLANTABLE DEVICE | Site: KNEE | Status: FUNCTIONAL

## 2018-11-30 DEVICE — BONE CEMENT SIMPLEX FULL DOSE 6191-1-001: Type: IMPLANTABLE DEVICE | Site: KNEE | Status: FUNCTIONAL

## 2018-11-30 DEVICE — IMPLANTABLE DEVICE: Type: IMPLANTABLE DEVICE | Site: KNEE | Status: FUNCTIONAL

## 2018-11-30 RX ORDER — CEFAZOLIN SODIUM 2 G/100ML
2 INJECTION, SOLUTION INTRAVENOUS
Status: COMPLETED | OUTPATIENT
Start: 2018-11-30 | End: 2018-11-30

## 2018-11-30 RX ORDER — ONDANSETRON 4 MG/1
4 TABLET, ORALLY DISINTEGRATING ORAL EVERY 6 HOURS PRN
Status: DISCONTINUED | OUTPATIENT
Start: 2018-11-30 | End: 2018-12-03 | Stop reason: HOSPADM

## 2018-11-30 RX ORDER — NALOXONE HYDROCHLORIDE 0.4 MG/ML
.1-.4 INJECTION, SOLUTION INTRAMUSCULAR; INTRAVENOUS; SUBCUTANEOUS
Status: DISCONTINUED | OUTPATIENT
Start: 2018-11-30 | End: 2018-12-03 | Stop reason: HOSPADM

## 2018-11-30 RX ORDER — ACETAMINOPHEN 325 MG/1
650 TABLET ORAL EVERY 4 HOURS PRN
Status: DISCONTINUED | OUTPATIENT
Start: 2018-12-03 | End: 2018-12-03 | Stop reason: HOSPADM

## 2018-11-30 RX ORDER — CETIRIZINE HYDROCHLORIDE 10 MG/1
10 TABLET ORAL AT BEDTIME
Status: DISCONTINUED | OUTPATIENT
Start: 2018-11-30 | End: 2018-12-03 | Stop reason: HOSPADM

## 2018-11-30 RX ORDER — FENTANYL CITRATE 50 UG/ML
25-50 INJECTION, SOLUTION INTRAMUSCULAR; INTRAVENOUS
Status: DISCONTINUED | OUTPATIENT
Start: 2018-11-30 | End: 2018-11-30 | Stop reason: HOSPADM

## 2018-11-30 RX ORDER — BISACODYL 10 MG
10 SUPPOSITORY, RECTAL RECTAL DAILY
Status: DISCONTINUED | OUTPATIENT
Start: 2018-12-01 | End: 2018-12-03 | Stop reason: HOSPADM

## 2018-11-30 RX ORDER — KETOROLAC TROMETHAMINE 30 MG/ML
INJECTION, SOLUTION INTRAMUSCULAR; INTRAVENOUS PRN
Status: DISCONTINUED | OUTPATIENT
Start: 2018-11-30 | End: 2018-11-30

## 2018-11-30 RX ORDER — LATANOPROST 50 UG/ML
1 SOLUTION/ DROPS OPHTHALMIC AT BEDTIME
Status: DISCONTINUED | OUTPATIENT
Start: 2018-11-30 | End: 2018-12-03 | Stop reason: HOSPADM

## 2018-11-30 RX ORDER — CEFAZOLIN SODIUM 1 G/3ML
1 INJECTION, POWDER, FOR SOLUTION INTRAMUSCULAR; INTRAVENOUS EVERY 8 HOURS
Status: COMPLETED | OUTPATIENT
Start: 2018-11-30 | End: 2018-12-01

## 2018-11-30 RX ORDER — ONDANSETRON 2 MG/ML
4 INJECTION INTRAMUSCULAR; INTRAVENOUS EVERY 30 MIN PRN
Status: DISCONTINUED | OUTPATIENT
Start: 2018-11-30 | End: 2018-11-30 | Stop reason: HOSPADM

## 2018-11-30 RX ORDER — NALOXONE HYDROCHLORIDE 0.4 MG/ML
.1-.4 INJECTION, SOLUTION INTRAMUSCULAR; INTRAVENOUS; SUBCUTANEOUS
Status: DISCONTINUED | OUTPATIENT
Start: 2018-11-30 | End: 2018-11-30 | Stop reason: HOSPADM

## 2018-11-30 RX ORDER — MAGNESIUM HYDROXIDE 1200 MG/15ML
LIQUID ORAL PRN
Status: DISCONTINUED | OUTPATIENT
Start: 2018-11-30 | End: 2018-11-30 | Stop reason: HOSPADM

## 2018-11-30 RX ORDER — AMLODIPINE BESYLATE 5 MG/1
5 TABLET ORAL DAILY
Status: DISCONTINUED | OUTPATIENT
Start: 2018-12-01 | End: 2018-11-30

## 2018-11-30 RX ORDER — PROPOFOL 10 MG/ML
INJECTION, EMULSION INTRAVENOUS PRN
Status: DISCONTINUED | OUTPATIENT
Start: 2018-11-30 | End: 2018-11-30

## 2018-11-30 RX ORDER — METOCLOPRAMIDE HYDROCHLORIDE 5 MG/ML
5 INJECTION INTRAMUSCULAR; INTRAVENOUS EVERY 6 HOURS PRN
Status: DISCONTINUED | OUTPATIENT
Start: 2018-11-30 | End: 2018-12-03 | Stop reason: HOSPADM

## 2018-11-30 RX ORDER — LIDOCAINE 40 MG/G
CREAM TOPICAL
Status: DISCONTINUED | OUTPATIENT
Start: 2018-11-30 | End: 2018-12-03 | Stop reason: HOSPADM

## 2018-11-30 RX ORDER — LABETALOL HYDROCHLORIDE 5 MG/ML
INJECTION, SOLUTION INTRAVENOUS PRN
Status: DISCONTINUED | OUTPATIENT
Start: 2018-11-30 | End: 2018-11-30

## 2018-11-30 RX ORDER — METOCLOPRAMIDE 5 MG/1
5 TABLET ORAL EVERY 6 HOURS PRN
Status: DISCONTINUED | OUTPATIENT
Start: 2018-11-30 | End: 2018-12-03 | Stop reason: HOSPADM

## 2018-11-30 RX ORDER — LIDOCAINE HYDROCHLORIDE 20 MG/ML
INJECTION, SOLUTION INFILTRATION; PERINEURAL PRN
Status: DISCONTINUED | OUTPATIENT
Start: 2018-11-30 | End: 2018-11-30

## 2018-11-30 RX ORDER — OXYCODONE HYDROCHLORIDE 5 MG/1
5-10 TABLET ORAL EVERY 4 HOURS PRN
Status: DISCONTINUED | OUTPATIENT
Start: 2018-11-30 | End: 2018-11-30

## 2018-11-30 RX ORDER — SODIUM CHLORIDE, SODIUM LACTATE, POTASSIUM CHLORIDE, CALCIUM CHLORIDE 600; 310; 30; 20 MG/100ML; MG/100ML; MG/100ML; MG/100ML
INJECTION, SOLUTION INTRAVENOUS CONTINUOUS
Status: DISCONTINUED | OUTPATIENT
Start: 2018-11-30 | End: 2018-11-30 | Stop reason: HOSPADM

## 2018-11-30 RX ORDER — AMOXICILLIN 250 MG
1 CAPSULE ORAL 2 TIMES DAILY
Status: DISCONTINUED | OUTPATIENT
Start: 2018-11-30 | End: 2018-12-03 | Stop reason: HOSPADM

## 2018-11-30 RX ORDER — ACETAMINOPHEN 325 MG/1
975 TABLET ORAL EVERY 8 HOURS
Status: DISPENSED | OUTPATIENT
Start: 2018-11-30 | End: 2018-12-03

## 2018-11-30 RX ORDER — MULTIPLE VITAMINS W/ MINERALS TAB 9MG-400MCG
1 TAB ORAL DAILY
Status: DISCONTINUED | OUTPATIENT
Start: 2018-11-30 | End: 2018-12-03 | Stop reason: HOSPADM

## 2018-11-30 RX ORDER — FLUMAZENIL 0.1 MG/ML
0.2 INJECTION, SOLUTION INTRAVENOUS
Status: DISCONTINUED | OUTPATIENT
Start: 2018-11-30 | End: 2018-11-30 | Stop reason: HOSPADM

## 2018-11-30 RX ORDER — CALCIUM CARBONATE 500(1250)
500 TABLET ORAL DAILY
Status: DISCONTINUED | OUTPATIENT
Start: 2018-11-30 | End: 2018-12-03 | Stop reason: HOSPADM

## 2018-11-30 RX ORDER — SODIUM CHLORIDE, SODIUM LACTATE, POTASSIUM CHLORIDE, CALCIUM CHLORIDE 600; 310; 30; 20 MG/100ML; MG/100ML; MG/100ML; MG/100ML
INJECTION, SOLUTION INTRAVENOUS CONTINUOUS
Status: DISCONTINUED | OUTPATIENT
Start: 2018-11-30 | End: 2018-12-03 | Stop reason: HOSPADM

## 2018-11-30 RX ORDER — PROCHLORPERAZINE MALEATE 5 MG
5 TABLET ORAL EVERY 6 HOURS PRN
Status: DISCONTINUED | OUTPATIENT
Start: 2018-11-30 | End: 2018-12-03 | Stop reason: HOSPADM

## 2018-11-30 RX ORDER — HYDROMORPHONE HYDROCHLORIDE 1 MG/ML
.3-.5 INJECTION, SOLUTION INTRAMUSCULAR; INTRAVENOUS; SUBCUTANEOUS EVERY 5 MIN PRN
Status: DISCONTINUED | OUTPATIENT
Start: 2018-11-30 | End: 2018-11-30 | Stop reason: HOSPADM

## 2018-11-30 RX ORDER — FENTANYL CITRATE 50 UG/ML
INJECTION, SOLUTION INTRAMUSCULAR; INTRAVENOUS PRN
Status: DISCONTINUED | OUTPATIENT
Start: 2018-11-30 | End: 2018-11-30

## 2018-11-30 RX ORDER — ONDANSETRON 4 MG/1
4 TABLET, ORALLY DISINTEGRATING ORAL EVERY 30 MIN PRN
Status: DISCONTINUED | OUTPATIENT
Start: 2018-11-30 | End: 2018-11-30 | Stop reason: HOSPADM

## 2018-11-30 RX ORDER — CETIRIZINE HYDROCHLORIDE 10 MG/1
10 TABLET ORAL DAILY
Status: DISCONTINUED | OUTPATIENT
Start: 2018-11-30 | End: 2018-11-30

## 2018-11-30 RX ORDER — NALOXONE HYDROCHLORIDE 0.4 MG/ML
.1-.4 INJECTION, SOLUTION INTRAMUSCULAR; INTRAVENOUS; SUBCUTANEOUS
Status: DISCONTINUED | OUTPATIENT
Start: 2018-11-30 | End: 2018-11-30

## 2018-11-30 RX ORDER — AMOXICILLIN 250 MG
2 CAPSULE ORAL 2 TIMES DAILY
Status: DISCONTINUED | OUTPATIENT
Start: 2018-11-30 | End: 2018-12-03 | Stop reason: HOSPADM

## 2018-11-30 RX ORDER — ONDANSETRON 2 MG/ML
4 INJECTION INTRAMUSCULAR; INTRAVENOUS EVERY 6 HOURS PRN
Status: DISCONTINUED | OUTPATIENT
Start: 2018-11-30 | End: 2018-12-03 | Stop reason: HOSPADM

## 2018-11-30 RX ORDER — EPHEDRINE SULFATE 50 MG/ML
INJECTION, SOLUTION INTRAMUSCULAR; INTRAVENOUS; SUBCUTANEOUS PRN
Status: DISCONTINUED | OUTPATIENT
Start: 2018-11-30 | End: 2018-11-30

## 2018-11-30 RX ORDER — ONDANSETRON 2 MG/ML
INJECTION INTRAMUSCULAR; INTRAVENOUS PRN
Status: DISCONTINUED | OUTPATIENT
Start: 2018-11-30 | End: 2018-11-30

## 2018-11-30 RX ADMIN — SODIUM CHLORIDE, POTASSIUM CHLORIDE, SODIUM LACTATE AND CALCIUM CHLORIDE: 600; 310; 30; 20 INJECTION, SOLUTION INTRAVENOUS at 07:27

## 2018-11-30 RX ADMIN — HYDROMORPHONE HYDROCHLORIDE 0.2 MG: 1 INJECTION, SOLUTION INTRAMUSCULAR; INTRAVENOUS; SUBCUTANEOUS at 10:04

## 2018-11-30 RX ADMIN — FENTANYL CITRATE 25 MCG: 50 INJECTION, SOLUTION INTRAMUSCULAR; INTRAVENOUS at 08:08

## 2018-11-30 RX ADMIN — CETIRIZINE HYDROCHLORIDE 10 MG: 10 TABLET, FILM COATED ORAL at 23:05

## 2018-11-30 RX ADMIN — PROPOFOL 70 MG: 10 INJECTION, EMULSION INTRAVENOUS at 07:38

## 2018-11-30 RX ADMIN — FENTANYL CITRATE 25 MCG: 50 INJECTION, SOLUTION INTRAMUSCULAR; INTRAVENOUS at 08:29

## 2018-11-30 RX ADMIN — SODIUM CHLORIDE 1000 ML: 900 IRRIGANT IRRIGATION at 09:59

## 2018-11-30 RX ADMIN — FENTANYL CITRATE 25 MCG: 50 INJECTION, SOLUTION INTRAMUSCULAR; INTRAVENOUS at 07:36

## 2018-11-30 RX ADMIN — KETOROLAC TROMETHAMINE 15 MG: 30 INJECTION, SOLUTION INTRAMUSCULAR at 10:12

## 2018-11-30 RX ADMIN — Medication 5 MG: at 07:57

## 2018-11-30 RX ADMIN — LATANOPROST 1 DROP: 50 SOLUTION OPHTHALMIC at 23:07

## 2018-11-30 RX ADMIN — HYDROMORPHONE HYDROCHLORIDE 0.1 MG: 1 INJECTION, SOLUTION INTRAMUSCULAR; INTRAVENOUS; SUBCUTANEOUS at 10:09

## 2018-11-30 RX ADMIN — FENTANYL CITRATE 25 MCG: 50 INJECTION, SOLUTION INTRAMUSCULAR; INTRAVENOUS at 08:21

## 2018-11-30 RX ADMIN — ROCURONIUM BROMIDE 20 MG: 10 INJECTION INTRAVENOUS at 08:15

## 2018-11-30 RX ADMIN — SODIUM CHLORIDE 400 ML: 900 IRRIGANT IRRIGATION at 09:59

## 2018-11-30 RX ADMIN — PROPOFOL 50 MG: 10 INJECTION, EMULSION INTRAVENOUS at 07:42

## 2018-11-30 RX ADMIN — CEFAZOLIN SODIUM 1 G: 1 INJECTION, POWDER, FOR SOLUTION INTRAMUSCULAR; INTRAVENOUS at 18:48

## 2018-11-30 RX ADMIN — SUGAMMADEX 50 MG: 100 INJECTION, SOLUTION INTRAVENOUS at 10:07

## 2018-11-30 RX ADMIN — FENTANYL CITRATE 25 MCG: 50 INJECTION, SOLUTION INTRAMUSCULAR; INTRAVENOUS at 10:53

## 2018-11-30 RX ADMIN — SODIUM CHLORIDE, POTASSIUM CHLORIDE, SODIUM LACTATE AND CALCIUM CHLORIDE: 600; 310; 30; 20 INJECTION, SOLUTION INTRAVENOUS at 09:17

## 2018-11-30 RX ADMIN — LABETALOL HYDROCHLORIDE 5 MG: 5 INJECTION INTRAVENOUS at 08:57

## 2018-11-30 RX ADMIN — PROPOFOL 30 MG: 10 INJECTION, EMULSION INTRAVENOUS at 07:40

## 2018-11-30 RX ADMIN — FENTANYL CITRATE 25 MCG: 50 INJECTION, SOLUTION INTRAMUSCULAR; INTRAVENOUS at 10:02

## 2018-11-30 RX ADMIN — ACETAMINOPHEN 975 MG: 325 TABLET, FILM COATED ORAL at 18:47

## 2018-11-30 RX ADMIN — HYDROMORPHONE HYDROCHLORIDE 0.3 MG: 1 INJECTION, SOLUTION INTRAMUSCULAR; INTRAVENOUS; SUBCUTANEOUS at 11:18

## 2018-11-30 RX ADMIN — SODIUM CHLORIDE 1 G: 9 INJECTION, SOLUTION INTRAVENOUS at 08:00

## 2018-11-30 RX ADMIN — SENNOSIDES AND DOCUSATE SODIUM 1 TABLET: 8.6; 5 TABLET ORAL at 20:05

## 2018-11-30 RX ADMIN — CEFAZOLIN SODIUM 2 G: 2 INJECTION, SOLUTION INTRAVENOUS at 08:00

## 2018-11-30 RX ADMIN — FENTANYL CITRATE 50 MCG: 50 INJECTION, SOLUTION INTRAMUSCULAR; INTRAVENOUS at 11:02

## 2018-11-30 RX ADMIN — FENTANYL CITRATE 25 MCG: 50 INJECTION, SOLUTION INTRAMUSCULAR; INTRAVENOUS at 10:58

## 2018-11-30 RX ADMIN — FENTANYL CITRATE 25 MCG: 50 INJECTION, SOLUTION INTRAMUSCULAR; INTRAVENOUS at 09:28

## 2018-11-30 RX ADMIN — CEFAZOLIN SODIUM 1 G: 2 INJECTION, SOLUTION INTRAVENOUS at 10:00

## 2018-11-30 RX ADMIN — ASPIRIN 325 MG: 325 TABLET, DELAYED RELEASE ORAL at 20:05

## 2018-11-30 RX ADMIN — Medication 5 MG: at 08:11

## 2018-11-30 RX ADMIN — ONDANSETRON 4 MG: 2 INJECTION INTRAMUSCULAR; INTRAVENOUS at 10:02

## 2018-11-30 RX ADMIN — LABETALOL HYDROCHLORIDE 5 MG: 5 INJECTION INTRAVENOUS at 08:44

## 2018-11-30 RX ADMIN — LIDOCAINE HYDROCHLORIDE 60 MG: 20 INJECTION, SOLUTION INFILTRATION; PERINEURAL at 07:36

## 2018-11-30 RX ADMIN — EPINEPHRINE: 1 INJECTION PARENTERAL at 09:42

## 2018-11-30 RX ADMIN — FENTANYL CITRATE 25 MCG: 50 INJECTION, SOLUTION INTRAMUSCULAR; INTRAVENOUS at 09:15

## 2018-11-30 RX ADMIN — ACETAMINOPHEN 650 MG: 325 TABLET, FILM COATED ORAL at 12:52

## 2018-11-30 RX ADMIN — LABETALOL HYDROCHLORIDE 5 MG: 5 INJECTION INTRAVENOUS at 09:12

## 2018-11-30 ASSESSMENT — ACTIVITIES OF DAILY LIVING (ADL)
ADLS_ACUITY_SCORE: 12
ADLS_ACUITY_SCORE: 14

## 2018-11-30 NOTE — PLAN OF CARE
Problem: Patient Care Overview  Goal: Plan of Care/Patient Progress Review  Outcome: No Change  Arrived from PACU via cart  At around 14:00.Patient A/Ox4. VSS. Oriented to call light.  Denies CP, SOB, dizziness/LH. LSCTA. +fl/BS. Sloan in place. Denies pain. CMS intact. Dressing to left  Knee CDI, ice applied. PARI draining. Tolerating clear diet. IS encouraged.  IV infusing. CAPNO on.Bed alarm . Family in room.Patient has demonstrated ability to call appropriately. Patient is resting with call light within reach. Will continue to monitor.

## 2018-11-30 NOTE — OP NOTE
Date: 11/30/18    Preoperative Diagnosis: left knee osteoarthritis    Postoperative Diagnosis: left knee osteoarthritis    Procedure: left total knee arthroplasty     Indications for Procedure: Luis Roe is a 87 year old female with known bilateral knee osteoarthritis. She has a history of right total hip arthroplasty with pelvis reconstruction using autograft bone on 10/30/2012. She has had progressive knee pain over the past 2-3 years. She attempted conservative management in the form of corticosteroid injections, OTC pain medications, and activity modification. She ultimately failed conservative management. The risks and benefits of total knee arthroplasty were discussed with the patient and she elected to proceed. Signed informed consent was obtained and placed in the chart.     Staff: Beto Jenkins MD    Assistants:   MD Ciro Arshad MD    Anesthesia: General    EBL: 25 cc    Specimens: none    Tourniquet Time: 120 minutes    Drains: deep PARI drain    Implants: Biomet Vanguard Total knee system size 62.5 posterior stabilized femur, size 67 tibial baseplate with locking bar, 34 mm patella, 18 mm posterior stabilized polyethylene liner.     Findings: Tricompartmental degenerative changes left knee.     Complications: none apparent.     Disposition: stable to PACU    DESCRIPTION OF PROCEDURE: Patient was met in the preoperative holding area.  The correct site was confirmed and marked. Informed consent was again reviewed with the patient and all questions were answered to the patient's satisfaction. The patient was transferred into the operating theater. General anesthesia was smoothly induced by our anesthesia colleagues. Patient was placed in the supine position on the operating table and was secured with safety straps. A bump was placed under the left hip. Bony prominences were well padded.  A tourniquet was applied to left lower extremity.  The patient was prepped and draped in the normal  sterile fashion.  A time out was performed with all members of the operating team participating per hospital policy. The correct patient, site, and procedure were all confirmed. All in attendance were in agreement.      The tourniquet was inflated after ensanguination. The knee was exposed via a standard anterior incision with medial arthrotomy through the quadriceps tendon. A medial release was performed to the middle of the medial side of the tibia. Retractors were placed and the knee was brought into flexion. The drill was used to enter the femoral canal with the starting point 1 cm posterior to the PCL femoral origin. The intramedullary guide was introduced. The distal femoral cut was performed in 5 degrees of valgus removing 10 mm of bone.     The tibial extramedullary guide was put into place. The guide was aligned to ensure neutral tibial slope without varus or valgus. The tibial cut was performed. The tensometer was placed with the knee in extension at 40 lbs of tension. The knee was still tight medially and the tensometer was removed. Medial release was extended to the posterior knee including the posterior oblique ligament and the semimembranosus. The tensometer was re-introduced and the knee was again felt to be slightly tight medially. An intra operative X-ray was obtained and the proximal tibial cut was found to be in slight valgus. Another 1-2 mm was removed with the saw from the medial proximal tibia. The tensometer was re-introduced and the extension gap was found to be balanced.     The knee was brought into flexion and with 40 pounds of tension the gap was 16 mm. The femoral sizer was introduced and indicated the need for a size 62.5. The correct holes were drilled ensuring 3 degrees of external rotation of the femur. The 4 in 1 cutting block was put in place and the anterior, posterior, and chamfer cuts were completed without difficulty.      A trial reduction was performed and the knee was found  to be stable with balanced collateral ligaments throughout the range of motion. The tibial component rotation was confirmed and marked.  Femoral component was removed and the tibial tray was used to prepare the tibial surface by using the punch for the tibial keel and winged flange.     The patella was everted and the saw was used to cut the articular surface after measuring the patellar thickness. Anatomic thickness was restored. Trial patellar button was placed and was found to track adequately throughout trial range of motion of the knee. The bony surface of the patella and tibia were drilled with an 1/8 inch drill bit to enhance cement interdigitation with the bone.      The knee was copiously irrigated using pulse lavage. Cement was mixed. The components were cemented into place. All excess cement was removed.     A 50 cc dose of the anesthetic cocktail using bupicaine, ketorolac, and morphine was injected into the medial and lateral gutters with care taken to avoid intravascular injection. A trial with a 16 mm and 18 mm polyethylene trial liners was completed, and the 18 mm polyethylene liner balanced the collateral ligaments throughout the range of motion of the knee. The final polyethylene was opened and put into place. The tibial polyethelene insert was locked in place.    Wound closure was performed after placing a drain in the knee.  Standard technique using 2-0 vicryl running suture for the fat pad and synovium, interrupted figure-of-8 0-vicryl sutures for arthrotomy and quadriceps, 2-0 vicryl for subcutaneous tissue, and 3-0 PDS running subcuticular skin closure.  Steristrips, adaptic, gauze and ace wrap dressing applied.      The patient was then extubated and transferred to the PACU in stable condition.  All sponge and needle counts were correct at the end of the case.    Post op Plan:     The patient will be admitted to the orthopaedic service post op. WBAT LLE. CPM to the LLE. PO pain control.  Aspirin 325mg BID x4 weeks for DVT prophylaxis. 24 hours post op antibiotics. Follow up with Dr. Jenkins 4 weeks post op.     Dr. Jenkins was present for all key portions of the procedure.      Heriberto Acosta MD  PGY-4  Orthopaedic Surgery  [phone]    Attending MD (Dr. Beto Jenkins) Attestation:  I was present during the key portions of the procedure and I was immediately available for the entire procedure between opening and closing.    Beto Jenkins MD  [facility] Family Professor  Oncology and Adult Reconstructive Surgery  Dept Orthopaedic Surgery, [facility]

## 2018-11-30 NOTE — IP AVS SNAPSHOT
MRN:[id number]                      After Visit Summary   11/30/2018    Luis Roe    MRN: [id number]           Thank you!     Thank you for choosing [facility] for your care. Our goal is always to provide you with excellent care. Hearing back from our patients is one way we can continue to improve our services. Please take a few minutes to complete the written survey that you may receive in the mail after you visit with us. Thank you!        Patient Information     Date Of Birth          8/7/1931        Designated Caregiver       Most Recent Value    Caregiver    Will someone help with your care after discharge? yes    Name of designated caregiver Michel (son)    Phone number of caregiver [phone]    Caregiver address [address] [place] [address]       About your hospital stay     You were admitted on:  November 30, 2018 You last received care in the:  [facility]    You were discharged on:  December 3, 2018        Reason for your hospital stay       Left knee replacement surgery                  Who to Call     For medical emergencies, please call 911.  For non-urgent questions about your medical care, please call your primary care provider or clinic, [phone]  For questions related to your surgery, please call your surgery clinic        Attending Provider     Provider Specialty    Beto Jenkins MD Orthopedics       Primary Care Provider Office Phone # Fax #    Los Lambert PA-C [phone]      After Care Instructions     Activity - Ambulate in hallway       Every shift            Activity - Up with assistive device           Activity - Up with nursing assistance           Continuous Passive Motion Machine       Start CPM Day of Surgery.  0 - 90 degrees. Advance as tolerated.            Discharge Instructions       TOTAL KNEE ARTHROPLASTY POST OPERATIVE DISCHARGE INSTRUCTIONS    FOLLOW UP APPOINTMENT  You are scheduled for a post operative appointment with Dr. Jenkins  approximately four weeks after surgery.     Your follow up appointments will be at the location that you regularly see Dr. Jenkins:    [facility]  [address] [place] [address]  [phone]    Physical therapy:   A prescription for physical therapy will be provided at the time of discharge.      ACTIVITY  Weight bearing status:   You may bear weight on your operative extremity as tolerated, using assistive devices (walker, cane) as needed. As you begin to feel more comfortable ambulating, you may gradually transition from a walker to a cane. Eventually, you should wean from all assistive devices. Although we would like you to discontinue use of assistive devices as soon as possible, do not transition until you have worked with your physical therapist to achieve safe balance and comfort.     Continuous passive motion machine:   Perform CPM exercises for six to eight hours per day for the first four weeks after surgery. The CPM should be set at 0 degrees to flexion tolerance with a goal of 90 degrees. Advance the CPM settings aggressively in increments of 5 degrees every 30 minutes until goal is achieved.     Exercises:   Perform the following exercises at least three times per day for the first four weeks after surgery to prevent complications, such as blood clots in your legs:  1) Point and flex your feet  2) Move your ankle around in big circles  3) Wiggle your toes   Also, perform thigh muscle tightening exercises for 10 to 15 minutes at least three times per day for the first four weeks after surgery.    Athletic Activities:  Activities such as swimming, bicycling, jogging, running, and stop-and-go sports should be avoided until permitted by your provider.    Driving:  Driving is not permitted until directed by your provider. Typically, driving is restricted for three to four weeks after right knee surgery and three weeks after left knee surgery. Under  no circumstance are you permitted to drive while using narcotic pain medications.    Return to Work:  You may return to work when directed by your provider. Typically, patients with desk/sitting jobs can return to work within two weeks while patients with heavy labor jobs can return to work around three months after surgery.      COMFORT AND PAIN MANAGEMENT  Elevation:   During times of inactivity throughout the first two weeks after surgery, make an effort to decrease swelling by elevating your operative extremity. This is most effectively done by lying down and placing several pillows lengthwise under your thigh and calf to raise your toes above the level of your nose. To ensure that you knee remains in full extension, do not place pillows directly under your knee.     Icing:  An ice pack will be provided to control swelling and discomfort after surgery. Place a thin towel on your skin and apply the ice pack overtop. You may apply ice for 20 minutes as often as two times per hour.    Pain Medications:  You will be discharged with acetaminophen (Tylenol) and a narcotic medication for pain management after surgery. Acetaminophen can help to effectively manage pain when used as prescribed, however, do not exceed the maximum daily dose of 3000 mg. The narcotic pain medication should be reserved for severe, breakthrough pain. Take the narcotic medication as prescribed and use only as often as necessary.       ANTICOAGULATION  Take warfarin as prescribed for a total of four weeks after surgery. This medication will require weekly INR checks (INR goal 1.5-2.5).       WOUND CARE AND SHOWERING  Wound care:  Keep the dressing on, clean, and dry for the first five days after surgery. You may then remove the dressing and replace it with fresh 4x4s and tubigrip (or ACE wrap). Your surgical incision was closed with a clear knotless suture and tails were left at the margins of the incision. These tails can be left in place until  your follow up appointment. The steri strips (small white tape that is directly on the incision areas) should be left in place until they fall off or are removed at your first office visit.    Showering:  You may shower ten days after surgery provided your incision is intact and dry without drainage. If there is fluid at the incision site, cover the incision with a non-permeable plastic bag. You may allow water to run over the incision, but do not soak or submerge the incision. Do not scrub the incision.     Tub Bathing:  Tub bathing, swimming, or any other activities that cause your incision to be submerged should be avoided until allowed by your provider. Typically, patients are allowed to return to these activities four weeks after surgery.      CONTACTING YOUR PHYSICIAN:  You may experience symptoms that require follow-up before your scheduled appointment. Please contact Dr. Jenkins s office if you experience:  1) Pain that persists or worsens in the first few days after surgery  2) Excessive redness or drainage of cloudy or bloody material from the wounds (clear red tinted fluid and some mild drainage should be expected) or drainage of any kind five days after surgery  3) A temperature elevation greater than 101.5    4) Pain, swelling or redness in your calf  5) Numbness or weakness in your leg or foot      Regular business hours (Monday - Friday, 8am - 5pm):  [facility]: [phone]    If you have any other concerns during normal business hours, please contact Dr. Jenkins's nurse, Clover Reed RN, at [phone] during normal business hours 8 am - 5 pm (leave message as needed). If your concern is urgent, please page Clover Reed RN at [phone] and key in your 10 digit phone number followed by the # sign after the beep.     After hours and weekends:  [facility] on call Orthopedic resident: [phone]    If you have an emergent concern, contact  the Emergency Department at the [facility] - [phone] - or [facility] - [phone] - [facility].            Fall precautions           General info for SNF       Length of Stay Estimate: Short Term Care: Estimated # of Days <30  Condition at Discharge: Improving  Level of care:skilled   Rehabilitation Potential: Excellent  Admission H&P remains valid and up-to-date: Yes  Recent Chemotherapy: N/A  Use Nursing Home Standing Orders: Yes            Mantoux instructions       Give two-step Mantoux (PPD) Per Facility Policy Yes            Weight bearing status       As tolerated left lower extremity            Wound care (specify)       Site:   Left knee  Instructions:  As directed below                  Follow-up Appointments     Follow Up and recommended labs and tests       Follow up with Dr. Jenkins in 4 weeks.  The following labs/tests are recommended: x rays of left knee.                  Your next 10 appointments already scheduled     Jan 07, 2019  9:15 AM CST   (Arrive by 9:00 AM)   RETURN KNEE with Beto Jenkins MD   [facility] ([facility])    [address] [place] [address]  [facility] [address] [phone]              Additional Services     Occupational Therapy Adult Consult       Evaluate and treat as clinically indicated.    Reason:  ADLs after knee replacement            Physical Therapy Adult Consult       Evaluate and treat as clinically indicated.    Reason:  Mobilization/strength after knee replacement                  Future tests that were ordered for you     Assign Questionnaire Series to Patient                 Pending Results     Date and Time Order Name Status Description    11/30/2018 0556 POC US GUIDANCE NEEDLE PLACEMENT In process             Statement of Approval     Ordered          12/03/18 9047  I have reviewed and agree with all the recommendations and orders detailed in this document.  EFFECTIVE NOW     Approved and electronically  "signed by:  Fely Cardozo APRN CNP             Admission Information     Date & Time Provider Department Dept. Phone    2018 Beto Jenkins MD [facility] [phone]      Your Vitals Were     Blood Pressure Pulse Temperature Respirations Height Weight    114/63 86 99  F (37.2  C) (Oral) 14 1.524 m (5') 65 kg (143 lb 4.8 oz)    Pulse Oximetry BMI (Body Mass Index)                93% 27.99 kg/m2          [email or web address] Information     [employer] lets you send messages to your doctor, view your test results, renew your prescriptions, schedule appointments and more. To sign up, go to [email or web address] . Click on \"Log in\" on the left side of the screen, which will take you to the Welcome page. Then click on \"Sign up Now\" on the right side of the page.     You will be asked to enter the access code listed below, as well as some personal information. Please follow the directions to create your username and password.     Your access code is: [email or web address]  Expires: 3/3/2019  1:17 PM     Your access code will  in 90 days. If you need help or a new code, please call your [facility] clinic or [phone].        Care EveryWhere ID     This is your Care EveryWhere ID. This could be used by other organizations to access your [facility] medical records  [id number]        Equal Access to Services     [facility]: Hadii ronen bassett hadasho Sokeciaali, waaxda luqadaha, qaybta kaalmada adeegyada, candie boyer . So [facility] [phone].    ATENCIÓN: Si habla español, tiene a piper disposición servicios gratuitos de asistencia lingüística. Lleliana al [phone].    We comply with applicable federal civil rights laws and [place] laws. We do not discriminate on the basis of race, color, national origin, age, disability, sex, sexual orientation, or gender identity.               Review of your medicines      START taking        Dose / Directions    acetaminophen 325 MG tablet   Commonly known as:  TYLENOL     "    Dose:  650 mg   Take 2 tablets (650 mg) by mouth every 4 hours as needed for other (multimodal surgical pain management along with NSAIDS and opioid medication as indicated based on pain control and physical function.)   Quantity:  100 tablet   Refills:  1       aspirin 325 MG EC tablet   Commonly known as:  ASA   Indication:  VTE Prophylaxis        Dose:  325 mg   Take 1 tablet (325 mg) by mouth 2 times daily for 28 days   Quantity:  40 tablet   Refills:  0       HYDROmorphone 2 MG tablet   Commonly known as:  DILAUDID        For pain of 3-6 give 1 mg, for pain of 7-10 give 2 mg every 3 hours as needed.   Quantity:  30 tablet   Refills:  0       senna-docusate 8.6-50 MG tablet   Commonly known as:  SENOKOT-S/PERICOLACE        Dose:  1 tablet   Take 1 tablet by mouth 2 times daily as needed for constipation   Quantity:  100 tablet   Refills:  0         CONTINUE these medicines which have NOT CHANGED        Dose / Directions    amLODIPine 5 MG tablet   Commonly known as:  NORVASC        Dose:  5 mg   Take 5 mg by mouth   Refills:  0       amoxicillin 500 MG capsule   Commonly known as:  AMOXIL   Used for:  OA (osteoarthritis) of hip        Take 4 capsules one hour prior to dental work.   Quantity:  4 capsule   Refills:  1       ascorbic acid 1000 MG Tabs tablet        Take 1,000 mg by mouth once daily. Takes 2 tablets (2,000mg)   Indications: supplement   Refills:  0       calcium carbonate 600 MG tablet   Commonly known as:  OS-ANGELINA 600 mg [place]. [place]        Take 1,200 mg by mouth 2 times daily.   Refills:  0       DAILY MULTIVITAMIN PO        Refills:  0       FISH OIL PO        Dose:  1000 mg   Take 1,000 mg by mouth   Refills:  0       latanoprost 0.005 % ophthalmic solution   Commonly known as:  XALATAN        INSTILL 1 DROP INTO BOTH EYES QPM UTD   Refills:  0       niacin 250 MG tablet        Take 500 mg by mouth once daily.   Refills:  0       RA CETIRIZINE 10 MG tablet   Generic drug:  cetirizine        Dose:   10 mg   Take 10 mg by mouth   Refills:  0       TRAVATAN Z 0.004 % ophthalmic solution   Generic drug:  travoprost FRANCISCO FREE        INT 1 GTT INTO OU QPM AS DIRECTED   Refills:  0       VITAMIN D-1000 MAX ST 1000 units Tabs   Generic drug:  cholecalciferol        Take 1,000 Units by mouth once daily. Takes 2 tablets (2,000mg)   Indications: PREVENTION OF VITAMIN D DEFICIENCY   Refills:  0         STOP taking     BOSWELLIA PO           IBUPROFEN PO           Red Yeast Rice 600 MG Tabs                Where to get your medicines      Some of these will need a paper prescription and others can be bought over the counter. Ask your nurse if you have questions.     You don't need a prescription for these medications     acetaminophen 325 MG tablet    aspirin 325 MG EC tablet    senna-docusate 8.6-50 MG tablet         Information about where to get these medications is not yet available     ! Ask your nurse or doctor about these medications     HYDROmorphone 2 MG tablet                Protect others around you: Learn how to safely use, store and throw away your medicines at www.disposemymeds.org.        Information about OPIOIDS     PRESCRIPTION OPIOIDS: WHAT YOU NEED TO KNOW   We gave you an opioid (narcotic) pain medicine. It is important to manage your pain, but opioids are not always the best choice. You should first try all the other options your care team gave you. Take this medicine for as short a time (and as few doses) as possible.    Some activities can increase your pain, such as bandage changes or therapy sessions. It may help to take your pain medicine 30 to 60 minutes before these activities. Reduce your stress by getting enough sleep, working on hobbies you enjoy and practicing relaxation or meditation. Talk to your care team about ways to manage your pain beyond prescription opioids.    These medicines have risks:    DO NOT drive when on new or higher doses of pain medicine. These medicines can affect your  alertness and reaction times, and you could be arrested for driving under the influence (DUI). If you need to use opioids long-term, talk to your care team about driving.    DO NOT operate heavy machinery    DO NOT do any other dangerous activities while taking these medicines.    DO NOT drink any alcohol while taking these medicines.     If the opioid prescribed includes acetaminophen, DO NOT take with any other medicines that contain acetaminophen. Read all labels carefully. Look for the word  acetaminophen  or  Tylenol.  Ask your pharmacist if you have questions or are unsure.    You can get addicted to pain medicines, especially if you have a history of addiction (chemical, alcohol or substance dependence). Talk to your care team about ways to reduce this risk.    All opioids tend to cause constipation. Drink plenty of water and eat foods that have a lot of fiber, such as fruits, vegetables, prune juice, apple juice and high-fiber cereal. Take a laxative (Miralax, milk of magnesia, Colace, Senna) if you don t move your bowels at least every other day. Other side effects include upset stomach, sleepiness, dizziness, throwing up, tolerance (needing more of the medicine to have the same effect), physical dependence and slowed breathing.    Store your pills in a secure place, locked if possible. We will not replace any lost or stolen medicine. If you don t finish your medicine, please throw away (dispose) as directed by your pharmacist. The [place] Pollution Control Agency has more information about safe disposal: https://www.pca.[facility].mn.us/living-green/managing-unwanted-medications             Medication List: This is a list of all your medications and when to take them. Check marks below indicate your daily home schedule. Keep this list as a reference.      Medications           Morning Afternoon Evening Bedtime As Needed    acetaminophen 325 MG tablet   Commonly known as:  TYLENOL   Take 2 tablets (650 mg) by  mouth every 4 hours as needed for other (multimodal surgical pain management along with NSAIDS and opioid medication as indicated based on pain control and physical function.)   Last time this was given:  975 mg on 12/3/2018  4:26 AM                                amLODIPine 5 MG tablet   Commonly known as:  NORVASC   Take 5 mg by mouth                                amoxicillin 500 MG capsule   Commonly known as:  AMOXIL   Take 4 capsules one hour prior to dental work.                                ascorbic acid 1000 MG Tabs tablet   Take 1,000 mg by mouth once daily. Takes 2 tablets (2,000mg)   Indications: supplement                                aspirin 325 MG EC tablet   Commonly known as:  ASA   Take 1 tablet (325 mg) by mouth 2 times daily for 28 days   Last time this was given:  325 mg on 12/3/2018  7:46 AM                                calcium carbonate 600 MG tablet   Commonly known as:  OS-ANGELINA 600 mg [place]. Ca   Take 1,200 mg by mouth 2 times daily.                                DAILY MULTIVITAMIN PO                                FISH OIL PO   Take 1,000 mg by mouth                                HYDROmorphone 2 MG tablet   Commonly known as:  DILAUDID   For pain of 3-6 give 1 mg, for pain of 7-10 give 2 mg every 3 hours as needed.   Last time this was given:  2 mg on 12/3/2018 10:11 AM                                latanoprost 0.005 % ophthalmic solution   Commonly known as:  XALATAN   INSTILL 1 DROP INTO BOTH EYES QPM UTD   Last time this was given:  1 drop on 12/2/2018  9:23 PM                                niacin 250 MG tablet   Take 500 mg by mouth once daily.                                RA CETIRIZINE 10 MG tablet   Take 10 mg by mouth   Last time this was given:  10 mg on 12/2/2018  9:22 PM   Generic drug:  cetirizine                                senna-docusate 8.6-50 MG tablet   Commonly known as:  SENOKOT-S/PERICOLACE   Take 1 tablet by mouth 2 times daily as needed for constipation    Last time this was given:  2 tablets on 12/3/2018  7:45 AM                                TRAVATAN Z 0.004 % ophthalmic solution   INT 1 GTT INTO OU QPM AS DIRECTED   Generic drug:  travoprost FRANCISCO FREE                                VITAMIN D-1000 MAX ST 1000 units Tabs   Take 1,000 Units by mouth once daily. Takes 2 tablets (2,000mg)   Indications: PREVENTION OF VITAMIN D DEFICIENCY   Last time this was given:  1,000 Units on 12/3/2018  7:45 AM   Generic drug:  cholecalciferol

## 2018-11-30 NOTE — OR NURSING
PACU to Inpatient Nursing Handoff    Patient Luis Roe is a 87 year old female who speaks English.   Procedure Procedure(s):  Left Total Knee Replacement   Surgeon(s) Primary: Beto Jenkins MD  Assisting: Ciro Wilkes MD  Resident - Assisting: Heriberto Acosta MD     Allergies   Allergen Reactions     Ace Inhibitors      Other reaction(s): Cough     No Clinical Screening - See Comments      Dust and mold: Nasal        Isolation  No active isolations     Past Medical History   has a past medical history of Hyperlipidemia; Hypertension; and Osteoporosis.    Anesthesia General   Dermatome Level     Preop Meds Not applicable   Nerve block Not applicable   Intraop Meds fentanyl (Sublimaze): 175 mcg total  hydromorphone (Dilaudid): 0.3 mg total  ketorolac (Toradol): last given at 1012  ondansetron (Zofran): last given at 1002  labetalol/ephedrine   Local Meds Yes - Local Cocktail (morphine, ropivacaine, epinephrine, Toradol)   Antibiotics cefazolin (Ancef) - last given at 1000     Pain Patient Currently in Pain: sleeping: patient not able to self report   PACU meds  fentanyl (Sublimaze): 100 mcg (total dose) last given at 1102   hydromorphone (Dilaudid): 0.3 mg (total dose) last given at 1118    PCA / epidural No   Capnography  Yes   Telemetry ECG Rhythm: Normal sinus rhythm   Inpatient Telemetry Monitor Ordered? No        Labs Glucose Lab Results   Component Value Date     11/30/2018       Hgb Lab Results   Component Value Date    HGB 14.5 11/30/2018       INR Lab Results   Component Value Date    INR 2.19 11/15/2012      PACU Imaging Completed     Wound/Incision Incision/Surgical Site 10/30/12 Lateral;Right Hip (Active)   Number of days:2222       Incision/Surgical Site 11/30/18 Left Knee (Active)   Incision Assessment WDL 11/30/2018  9:47 AM   Closure Adhesive strip(s);Approximated 11/30/2018  9:47 AM   Dressing Intervention Clean, dry, intact;New dressing applied 11/30/2018  9:47 AM   Number  of days:0      CMS Peripheral Neurovascular WDL: pulses (Group);sensation (Group) (11/30/18 1042)  All Extremities Temperature: warm (11/30/18 1042)  All Extremities Color: no discoloration (11/30/18 1042)   Equipment Not applicable   Other LDA       IV Access Peripheral IV 11/30/18 Left (Active)   Site Assessment WDL 11/30/2018 10:42 AM   Line Status Infusing 11/30/2018 10:42 AM   Phlebitis Scale 0-->no symptoms 11/30/2018 10:42 AM   Infiltration Scale 0 11/30/2018 10:42 AM   Number of days:0      Blood Products Not applicable EBL 25 mL   Intake/Output Date 11/30/18 0700 - 12/01/18 0659   Shift 9574-5697 8194-5097 4606-6918 24 Hour Total   I  N  T  A  K  E   I.V. 1300   1300    Shift Total  (mL/kg) 1300  (20)   1300  (20)   O  U  T  P  U  T   Urine 300   300    Blood 25   25    Shift Total  (mL/kg) 325  (5)   325  (5)   Weight (kg) 65 65 65 65        Drains / Sloan Closed/Suction Drain Left Knee Bulb 15 Tajik (Active)   Site Description UTV 11/30/2018 10:42 AM   Dressing Status Normal: Clean, Dry & Intact 11/30/2018 10:42 AM   Status To bulb suction 11/30/2018 10:42 AM   Number of days:0       Urethral Catheter Double-lumen;Latex;Straight-tip 16 fr (Active)   Tube Description UT 11/30/2018 10:42 AM   Collection Container Standard 11/30/2018 10:42 AM   Securement Method Securing device (Describe) 11/30/2018 10:42 AM   Number of days:0      Time of void PreOp Void Prior to Procedure: 0549 (11/30/18 0627)    PostOp      Diapered? No   Bladder Scan     PO    ice chips     Vitals    B/P: 135/84  T: 98.6  F (37  C)    Temp src: Axillary  P:  Pulse: 101 (11/30/18 0500)          R: (!) 33  O2:  SpO2: 97 %                   Family/support present daughters   Patient belongings  with patient   Patient transported on cart   DC meds/scripts (obs/outpt) Not applicable   Inpatient Pain Meds Released? Yes       Special needs/considerations None   Tasks needing completion None       Pedro Chan, RN  ASCOM [id number]

## 2018-11-30 NOTE — ANESTHESIA PREPROCEDURE EVALUATION
Anesthesia Pre-Procedure Evaluation    Patient: Luis Roe   MRN:     [id number] Gender:   female   Age:    87 year old :      1931        Preoperative Diagnosis: Osteoarthritis Left Knee, Bilateral Knee Pain   Procedure(s):  Left Total Knee Replacement     Past Medical History:   Diagnosis Date     Hyperlipidemia      Hypertension      Osteoporosis       Past Surgical History:   Procedure Laterality Date     ARTHROPLASTY HIP  10/30/2012    Procedure: ARTHROPLASTY HIP;  Right Total Hip Replacement;  Surgeon: Beto Jenkins MD;  Location: [facility]     HIP SURGERY       HYSTERECTOMY       REPAIR BLADDER       TONSILLECTOMY            Anesthesia Evaluation     . Pt has had prior anesthetic.     History of anesthetic complications    Sedates very easily.  Very slow emergence and is highly sensitive to narcotics.      ROS/MED HX    ENT/Pulmonary:     (+)allergic rhinitis, , . .    Neurologic:  - neg neurologic ROS     Cardiovascular:     (+) hypertension----. : . . . :. .       METS/Exercise Tolerance:     Hematologic:  - neg hematologic  ROS       Musculoskeletal:  - neg musculoskeletal ROS       GI/Hepatic:  - neg GI/hepatic ROS       Renal/Genitourinary:  - ROS Renal section negative       Endo:  - neg endo ROS       Psychiatric:  - neg psychiatric ROS       Infectious Disease:  - neg infectious disease ROS       Malignancy:      - no malignancy   Other:                         PHYSICAL EXAM:   Mental Status/Neuro: A/A/O   Airway: Facies: Feasible  Mallampati: II  Mouth/Opening: Full  TM distance: > 6 cm  Neck ROM: Full   Respiratory: Auscultation: CTAB     Resp. Rate: Normal     Resp. Effort: Normal      CV: Rhythm: Regular  Rate: Age appropriate  Heart: Normal Sounds   Comments:     Airway  -  Lower partial.  Full ROM.     Dental: Normal                  Lab Results   Component Value Date    WBC 7.9 2012    HGB 10.8 (L) 2012    HCT 33.6 (L) 2012     2012     2012     POTASSIUM 4.0 11/09/2012    CHLORIDE 104 11/09/2012    CO2 25 11/09/2012    BUN 12 11/09/2012    CR 0.66 11/09/2012     (H) 11/09/2012    ANGELINA 8.2 (L) 11/09/2012    MAG 1.6 10/30/2012    INR 2.19 (H) 11/15/2012       Preop Vitals  BP Readings from Last 3 Encounters:   11/30/18 130/79   11/15/12 111/72   11/04/12 133/70    Pulse Readings from Last 3 Encounters:   11/30/18 101   11/15/12 94   11/04/12 93      Resp Readings from Last 3 Encounters:   11/30/18 18   11/15/12 18   11/04/12 16    SpO2 Readings from Last 3 Encounters:   11/30/18 96%   11/15/12 94%   11/04/12 97%      Temp Readings from Last 1 Encounters:   11/30/18 36.6  C (97.9  F) (Oral)    Ht Readings from Last 1 Encounters:   11/30/18 1.524 m (5')      Wt Readings from Last 1 Encounters:   11/30/18 65 kg (143 lb 4.8 oz)    Estimated body mass index is 27.99 kg/(m^2) as calculated from the following:    Height as of this encounter: 1.524 m (5').    Weight as of this encounter: 65 kg (143 lb 4.8 oz).     LDA:            Assessment:   ASA SCORE: 2    NPO Status: > 6 hours since completed Solid Foods   Documentation: H&P complete; Preop Testing complete; Consents complete   Proceeding: Proceed without further delay  Tobacco Use:  NO Active use of Tobacco/UNKNOWN Tobacco use status     Plan:   Anes. Type:  General; Regional     RA Details:  Pre Induction; SS; Exparel; FOR POSTOP PAIN CONTROL   Pre-Induction: Midazolam IV; Opioid IV; Acetaminophen PO   Induction:  IV (Standard)   Airway: Oral ETT   Access/Monitoring: PIV   Maintenance: Balanced   Emergence: Procedure Site   Logistics: Same Day Surgery     Postop Pain/Sedation Strategy:  Standard-Options: Opioids PRN; Block SS; Exparel     PONV Management:  Adult Risk Factors: Female, Non-Smoker, Postop Opioids  Prevention: Ondansetron     CONSENT: Direct conversation   Plan and risks discussed with: Patient        Comments for Plan/Consent:      ======        Labs (11/27/2018)               Na                                144           K                                 3.8           BUN/Cr                       14.0/0.68             Ca                               9.4      Labs today -  Hg, T & S - pending        =======      Plan  -  GETA.  Regional block for postop pain control.        The risks, benefits and possible complications of GETA discussed in full with the patient and her daughter.  She voices understanding and wishes to proceed.      Dr. Jade Shea MD  Anesthesia  11/30/2018 @ 0622 am.                                 Jade Shea MD

## 2018-11-30 NOTE — ANESTHESIA POSTPROCEDURE EVALUATION
Anesthesia POST Procedure Evaluation    Patient: Luis Roe   MRN:     [id number] Gender:   female   Age:    87 year old :      1931        Preoperative Diagnosis: Osteoarthritis Left Knee, Bilateral Knee Pain   Procedure(s):  Left Total Knee Replacement   Postop Comments: No value filed.       Anesthesia Type:  General, Regional    Reportable Event: NO     PAIN: Uncomplicated   Sign Out status: Comfortable, Well controlled pain     PONV: No PONV   Sign Out status:  No Nausea or Vomiting     Neuro/Psych: Uneventful perioperative course   Sign Out Status: Preoperative baseline; Age appropriate mentation     Airway/Resp.: Uneventful perioperative course   Sign Out Status: Non labored breathing, age appropriate RR; Resp. Status within EXPECTED Parameters     CV: Uneventful perioperative course   Sign Out status: Appropriate BP and perfusion indices; Appropriate HR/Rhythm     Disposition:   Sign Out in:  PACU  Disposition:  Floor  Recovery Course: Uneventful  Follow-Up: Not required     Comments/Narrative:    Seen at the bedside in PACU.  Doing well.  Fully awake and alert, happy.  Conversing with her daughters at the bedside and eating apple sauce.  VSS.  Voicing no complaints.  No apparent anesthesia complications.   Discharge to the floor soon.      Dr. Jade Shea MD  Anesthesia  2018 @ 1243 pm.           Last Anesthesia Record Vitals:  CRNA VITALS  2018 1010 - 2018 1110      2018             Pulse: 90    SpO2: 97 %          Last PACU/Preop Vitals:  Vitals:    18 1130 18 1200 18 1230   BP: 111/66 113/75 115/72   Pulse:      Resp: 12    Temp:  36.9  C (98.5  F)    SpO2: 93% 95% 94%         Electronically Signed By: Jade Shea MD, 2018, 12:41 PM

## 2018-11-30 NOTE — PROGRESS NOTES
11/30/18 1617   Quick Adds   Type of Visit Initial PT Evaluation       Present no   Language English   Living Environment   Lives With alone  (grandson been staying with her)   Living Arrangements house   Home Accessibility grab bars present (bathtub);tub/shower is not walk in   Number of Stairs to Enter Home 1   Number of Stairs Within Home 6  (up to bedroom and 6 downstairs to basement)   Stair Railings at Home inside, present on left side   Transportation Available family or friend will provide;car   Living Environment Comment Pt reported having 1 step into the home and then 6 to go up to her bedroom or down to the basement.    Self-Care   Dominant Hand left   Usual Activity Tolerance moderate   Current Activity Tolerance fair   Regular Exercise other (see comments)  (Going to silver sneakers 2 months ago )   Equipment Currently Used at Home none   Activity/Exercise/Self-Care Comment Pt reported being independent with all ADLs at baseline.    Functional Level Prior   Ambulation 0-->independent   Transferring 0-->independent   Toileting 0-->independent   Bathing 0-->independent   Dressing 0-->independent   Eating 0-->independent   Communication 0-->understands/communicates without difficulty   Swallowing 0-->swallows foods/liquids without difficulty   Cognition 0 - no cognition issues reported   Fall history within last six months yes   Number of times patient has fallen within last six months 1   Which of the above functional risks had a recent onset or change? ambulation;transferring   Prior Functional Level Comment Pt reported not using an AD prior to surgery.    General Information   Onset of Illness/Injury or Date of Surgery - Date 11/29/18   Referring Physician Beto Jenkins MD   Patient/Family Goals Statement Pt would like to get back to the health club and go to [employer] on Sundays.    Pertinent History of Current Problem (include personal factors and/or comorbidities that impact the  POC) Pt is s/p left TKA, see chart for PMH.    Precautions/Limitations fall precautions  (No pillow under left knee)   Weight-Bearing Status - LUE full weight-bearing   Weight-Bearing Status - RUE full weight-bearing   Weight-Bearing Status - LLE weight-bearing as tolerated   Weight-Bearing Status - RLE full weight-bearing   Heart Disease Risk Factors High blood pressure   General Observations Pt supine in bed at start of PT evaluation.  Pt has hemovac in place, getting IV fluids, oxygen, capnography, BP cuff, and PCDs.    General Info Comments Pt daughters present for part of PT evaluation/treatment.    Cognitive Status Examination   Orientation other (see comments)  (Not tested)   Level of Consciousness alert   Follows Commands and Answers Questions 100% of the time;able to follow multistep instructions   Personal Safety and Judgment intact   Memory intact   Pain Assessment   Patient Currently in Pain Yes, see Vital Sign flowsheet   Integumentary/Edema   Integumentary/Edema other (describe)   Integumentary/Edema Comments Incision not observerd due to post-op dressing in place.    Posture    Posture Forward head position;Protracted shoulders   Range of Motion (ROM)   ROM Comment Left knee ROM 0-10-90 degrees AROM.  Pt demonstrated decreased right LE ROM with bed mobiity, transfers, and gait.     Strength   Strength Comments LE strength not formally tested.  Pt needed min A with lifting left LE in/out of bed and needed assistance SLR.  Pt demonstrated decreased right LE weakness when getting into bed, needed to assist it into bed using UEs.     Bed Mobility   Bed Mobility Comments Supine to/form sitting with HOB elevated and min A x 1.  Supine scooting with SBA x 1.    Transfer Skills   Transfer Comments Sit to standing from EOB with FWW and min A x 1.  Standing to sitting at EOB with FWW and CGA x 1.     Gait   Gait Comments Pt was able to take 3 side steps towards HOB with FWW and CGA x 1.     Balance   Balance  "Comments Pt demonstrated good sitting balance at EOB and fair standing balance with FWW.     Sensory Examination   Sensory Perception no deficits were identified   General Therapy Interventions   Planned Therapy Interventions bed mobility training;gait training;ROM;strengthening;transfer training;home program guidelines   Intervention Comments Bed mobility, transfers, gait, and LE strengthening/ROM initiated.    Clinical Impression   Criteria for Skilled Therapeutic Intervention yes, treatment indicated   PT Diagnosis Decreased strength, decreased ROM, and impaired functional moblity.     Influenced by the following impairments Post-op pain, weakness, decreased ROM, and s/p left TKA.    Functional limitations due to impairments Impaired bed mobility, transfers, and gait.    Clinical Presentation Stable/Uncomplicated   Clinical Presentation Rationale Pt is a 88 y/o female s/p left TKA.  Pt was independent with all functional mobility and ADLs at baseline.  Pt does not have any significant PMH that will impact PT POC.  Pt reported that knee pain prior to surgery was limiting mobility.    Clinical Decision Making (Complexity) Low complexity   Therapy Frequency` 2 times/day   Predicted Duration of Therapy Intervention (days/wks) 4 days   Anticipated Equipment Needs at Discharge other (see comments)  (Has SEC and FWW)   Anticipated Discharge Disposition Transitional Care Facility   Risk & Benefits of therapy have been explained Yes   Patient, Family & other staff in agreement with plan of care Yes   [facility] AM-PAC TM \"6 Clicks\"   2016, Trustees of [facility], under license to [employer].  All rights reserved.   6 Clicks Short Forms Basic Mobility Inpatient Short Form   [facility] AM-PAC  \"6 Clicks\" V.2 Basic Mobility Inpatient Short Form   1. Turning from your back to your side while in a flat bed without using bedrails? 4 - None   2. Moving from lying on your back to sitting on the side of a " flat bed without using bedrails? 3 - A Little   3. Moving to and from a bed to a chair (including a wheelchair)? 3 - A Little   4. Standing up from a chair using your arms (e.g., wheelchair, or bedside chair)? 3 - A Little   5. To walk in hospital room? 3 - A Little   6. Climbing 3-5 steps with a railing? 2 - A Lot   Basic Mobility Raw Score (Score out of 24.Lower scores equate to lower levels of function) 18   Total Evaluation Time   Total Evaluation Time (Minutes) 13

## 2018-11-30 NOTE — IP AVS SNAPSHOT
[facility]    [address]    [place] [address]    Phone:  [phone]                                       After Visit Summary   11/30/2018    Luis Roe    MRN: [id number]           After Visit Summary Signature Page     I have received my discharge instructions, and my questions have been answered. I have discussed any challenges I see with this plan with the nurse or doctor.    ..........................................................................................................................................  Patient/Patient Representative Signature      ..........................................................................................................................................  Patient Representative Print Name and Relationship to Patient    ..................................................               ................................................  Date                                   Time    ..........................................................................................................................................  Reviewed by Signature/Title    ...................................................              ..............................................  Date                                               Time          22EPIC Rev 08/18

## 2018-11-30 NOTE — ANESTHESIA CARE TRANSFER NOTE
Patient: Luis Roe    Procedure(s):  Left Total Knee Replacement    Diagnosis: Osteoarthritis Left Knee, Bilateral Knee Pain  Diagnosis Additional Information: No value filed.    Anesthesia Type:   General     Note:  Airway :Face Mask  Patient transferred to:PACU  Comments:   -on 6L O2 via FM, Pt Spont.  breathing, awake & alert, monitors placed, VSS, RN at bedside, no airway      Vitals: (Last set prior to Anesthesia Care Transfer)    CRNA VITALS  11/30/2018 1010 - 11/30/2018 1044      11/30/2018             Pulse: 90    SpO2: 97 %                Electronically Signed By: EFRAÍN Cueva CRNA  November 30, 2018  10:44 AM

## 2018-11-30 NOTE — PLAN OF CARE
Problem: Patient Care Overview  Goal: Plan of Care/Patient Progress Review  Discharge Planner PT   Patient plan for discharge: TCU  Current status: PT evaluation completed.  Supine to/from sitting with HOB elevated and min A x 1, supine scooting with SBA x 1.  Sit to standing from EOB with FWW and min A x 1.  Pt demonstrated fair standing balance at EOB, was able to take 3 side steps towards HOB with FWW and CGA x 1.  Pt knee ROM 0-10-90 degrees.   Barriers to return to prior living situation: Level of assist, stairs to get into the home/within the home, and pain.  Recommendations for discharge: TCU  Rationale for recommendations: Pt would benefit from further skilled PT for LE strengthening/ROM and increase independence with all functional mobility/gait.        Entered by: Kelley Whittaker 11/30/2018 4:59 PM

## 2018-11-30 NOTE — CONSULTS
Consult Date:  11/30/2018      INTERNAL MEDICINE CONSULTATION      ATTENDING PHYSICIAN:   Beto Jenkins MD      HISTORY OF PRESENT ILLNESS:  This is a pleasant 87-year-old female who underwent left total knee arthroplasty earlier today by Dr. Beto Jenkins from Orthopedic Surgery.  We were asked to see the patient postoperatively for Internal Medicine consultation to include assistance with perioperative fluid and pain management as well as to address status and intervene with regard to known history of essential hypertension and prediabetes.      The patient with osteoarthritis of the knees bilaterally, more severe on the right, however, increased pain on the left.  Cortisone injection to the right knee with reduction in degree of discomfort.  Decision to proceed with a left total knee arthroplasty earlier today (as above).  Procedure tolerated well under general anesthesia.  Transient drop in blood pressure to 70 systolic.  Estimated blood loss 25 mL. Urine output 300 mL. Received 1300 mL of lactated Ringer's.  Tolerable level of pain control presently.  No opiate requirement preoperatively.  She did undergo right total hip arthroplasty in 2012, developing respiratory depression with hypoxemia and perhaps mild delirium related to excess opiate analgesia, believes she used oxycodone at that time.  No chest discomfort or dyspnea.  No known history of heart disease.  Stress testing over 10 years ago, which was unremarkable.  Up until 1 month ago, the patient was attending [facility], doing mild aerobic exercise 2-3 days weekly without chest pain or shortness of breath.      No prior issues with anesthesia except for prolonged period to awaken.  No history of abnormal bleeding tendency or blood clots.  Prior transfusion without reaction.  No recent systemic steroids.  Sinusitis two months ago which has since cleared.  No lingering symptomatic compromise.      Essential hypertension.  Did take a.m. amlodipine 5 mg  at approximately 4 a.m.  Approximately 2 months ago at time of sinusitis, the patient lost 2 close friends, also developed dizziness.   She was concerned about the upcoming surgery.  Significant anxiety for which patient was placed on Paxil with clinical improvement.      PAST MEDICAL HISTORY:   1.  Osteoarthritis of knees bilaterally with anticipated right total knee arthroplasty in the future.   2.  History of elevated blood sugar, labeled as prediabetes.  Recent hemoglobin A1c not noted on record.  No Accu-Chek monitoring.   3.  Essential hypertension (as above).   4.  Hyperlipidemia, on red yeast rice, niacin and fish oil.   5.  Osteoporosis.   6.  Environmental allergies, on continued Zyrtec nightly.   7.  Anxiety disorder on Paxil.   8.  Without known coronary disease, reactive airway disease, intrinsic renal disease, peptic ulcer disease, hepatitis, gallbladder disease, thyroid disease, seizure, tuberculosis or anemia.      PAST SURGICAL HISTORY:   1.  Colonoscopy.   2.  Bladder repair.   3.  Cataract extraction bilaterally.   4.  Right total hip replacement in 2012.   5.  Hysterectomy (has ovaries).   6.  Tonsillectomy.      MEDICATION ALLERGIES:  INCLUDE COUGH RELATED TO ACE INHIBITOR.      MEDICATIONS PRIOR TO ADMISSION:   1.  Amlodipine 5 mg daily.   2.  Calcium carbonate 500 mg daily.   3.  Zyrtec 10 mg at bedtime.   4.  Xalatan 0.005% 1 drop in each eye at bedtime.   5.  Amoxicillin prior to dental work.   6.  Vitamin C 1000 mg daily.     7.  Boswellia p.o. more than a month ago.   8.  Vitamin D 1000 units daily.   9.  Ibuprofen p.r.n.   10.  Multivitamin daily.   11.  Niacin 500 mg daily.   12.  Fish oil 1000 mg daily (over 1 month ago).   13.  Red yeast rice daily.   14.  Travoprost 0.004% ophthalmic solution 1 drop in each eye q.p.m., last dose in the past month.      FAMILY HISTORY:  Mother with C. difficile at age 90.  Father with heart disease.  Brother with heart disease, also diabetes.       HABITS:  Smoked for 1 year at age 18.  No alcohol.  No other drug use.      SOCIAL HISTORY:  Lives alone in her own home.  Supportive children.  One son that comes in from [place] to stay 1-2 months with a patient during the winter.  Two daughters, one I believe a , the other an RN at [employer].      REVIEW OF SYSTEMS:  Denies fever, chills or sweats.  No headache.  Resolution of noted dizziness with sinus infection.  No visual change.  No chest discomfort, dyspnea, cough, palpitations.  No nausea or vomiting, no reflux or abdominal pain.  No diarrhea or constipation.  Last bowel movement on 11/28.  No signs of GI blood loss.  No voiding complaints except for urinary frequency.  No dysuria.  Arthralgia, right knee.  No rash.  Altered sensation of the toes bilaterally subsequent to the above hip procedure.      OBJECTIVE:   GENERAL:  Well-preserved elderly female, sitting semi-upright in bed in no distress, alert and oriented.   VITAL SIGNS:  Blood pressure 114/64, heart rate 60s and regular, respirations nonlabored, O2 sat 96% on 2 liters by nasal cannula, did have respirations earlier down to 7, has since improved to normal.  Temperature normal.   HEENT:  Extraocular movements full.  No nystagmus.  Pupils equal and reacting symmetrically.  Sclerae anicteric.  Fundi deferred.  Oropharynx is clear.  Dentition with adequate repair.  Mucosal membranes are dry.  Carotid upstroke is brisk, no bruits.  There is no thyromegaly or lymphadenopathy.   SKIN:  No rash (exposed areas).   CHEST:  Clear lung fields.   CARDIAC:  Regular without gallop or murmur.  No click, no jugular venous distention.   BREASTS:  Exam deferred.   ABDOMEN:  Nondistended, soft, nontender without palpable organomegaly or mass.  Bowel sounds are present.  No epigastric or iliac bruits.   EXTREMITIES:  Distal lower extremities are well perfused. No cyanosis, clubbing or edema.   MUSCULOSKELETAL:  No synovitis.  There is no posterior  calf or thigh tenderness/induration to suggest DVT.   PELVIC/RECTAL:  Deferred.   NEUROLOGIC:  Grossly nonfocal.  Sensory exam not performed.      LABORATORY DATA:  Available laboratory data includes preop urinalysis on 2018 which was bland.  Sodium 144, potassium 3.8, chloride 105, CO2 26.2, BUN 14, creatinine 0.68.  Random glucose 105.  Hemoglobin earlier today 14.5 grams percent with random blood sugar 107.  EKG on 2018 demonstrated normal sinus rhythm with left axis deviation, MN 0.20 consistent with first-degree AV block.  No ischemic change.      ASSESSMENT:  Pleasant 87-year-old female admitted with the followin.  Left total knee arthroplasty.  Indication:  Osteoarthritis.  Tolerable level of pain control.  Respiratory depression related to past excess opiate use at time of right total hip arthroplasty in .  She believes she did use oxycodone at that time.   2.  Osteoarthritis of right knee with anticipated right total knee arthroplasty in the future.   3.  Potential for acute blood loss anemia.   4.  Elevated blood sugar consistent with prediabetes.   5.  Essential hypertension, adequate control.  Transient hypotension intraoperatively.  Would hold amlodipine perioperatively in light of potential blood pressure lowering affect from opiates, blood loss, reduced activity.   6.  Hyperlipidemia, on red yeast rice, niacin and fish oil.   7.  Osteoporosis.   8.  Environmental allergies on bedtime Zyrtec.   9.  Anxiety disorder, fairly well-compensated on low dose Paxil.   10.  Surgeries as above.      PLAN:   1.  Intravenous fluid support adequate p.o. intake.   2.  Parameters for which nursing staff should call.   3.  Serial followup labs.   4.  Incentive spirometry/continuous oximetry.   5.  Review/resume prior to admission medications as appropriate.  Hold amlodipine for now perioperatively.   6.  Review opiate regimen as per Orthopedics.  Would prefer to proceed with low-dose Dilaudid 1  mg q.4 hours p.r.n. in addition to scheduled Extra Strength Tylenol as well as Lidoderm and menthol patches.  Defer Toradol presently with patient's elderly age and potential nephrotoxicity.   7.  Bowel meds to reduce constipation on opiates.   8.  Coumadin for DVT prophylaxis as per Dr. Jenkins.   9.  Antibiotics as per Dr. Jenkins perioperatively.   10.  Close clinical monitoring.  It is anticipated that patient will eventually go to TCU at discharge as she lives alone and her children are not regularly available due to work schedule.      Thank you for the consultation.         YULIYA GODINEZ MD             D: 2018   T: 2018   MT: LEROY      Name:     MARVIN SIEGEL   MRN:      [id number]        Account:       [id number]   :      1931           Consult Date:  2018      Document: [id number]

## 2018-11-30 NOTE — LETTER
Transition Communication Hand-off for Care Transitions to Next Level of Care Provider    Name: Luis Roe  : 1931  MRN #: [id number]  Primary Care Provider: Los Lambert     Primary Clinic: [facility] [address]  [facility] [address]     Reason for Hospitalization:  Osteoarthritis Left Knee, Bilateral Knee Pain  Status post knee surgery  Admit Date/Time: 2018  5:42 AM  Discharge Date: December 3, 2018  Payor Source: Payor: MEDICARE / Plan: MEDICARE / Product Type: Medicare /            Reason for Communication Hand-off Referral: Other: Discharge Planning    Discharge Plan:  [facility], [phone], followed by NP Rosalia Swanson     Concern for non-adherence with plan of care:   Y/N No  Discharge Needs Assessment:  Needs       Most Recent Value    Equipment Currently Used at Home none    Transportation Available family or friend will provide        Follow-up plan:  Future Appointments  Date Time Provider Department Center   12/3/2018 7:00 PM UR PT [facility] [place]   2018 6:30 AM UR OT [facility] [place]   2018 6:30 AM UR PT [facility] [place]   2018 6:30 PM UR PT [facility] [place]   2019 9:15 AM Beto Jenkins MD [facility]       Any outstanding tests or procedures:        Referrals     Future Labs/Procedures    Occupational Therapy Adult Consult     Comments:    Evaluate and treat as clinically indicated.    Reason:  ADLs after knee replacement    Physical Therapy Adult Consult     Comments:    Evaluate and treat as clinically indicated.    Reason:  Mobilization/strength after knee replacement            GILBERTO Gastelum Student  [phone]    AVS/Discharge Summary is the source of truth; this is a helpful guide for improved communication of patient story

## 2018-11-30 NOTE — PROGRESS NOTES
SPIRITUAL HEALTH SERVICES  SPIRITUAL ASSESSMENT Progress Note  [facility] Pre-Op   ON-CALL VISIT    REFERRAL SOURCE: Pre-surgery  request     I visited patient, Luis Roe, and her daughter Lona in response to Luis's request for a  visit prior to her surgery this morning.  I offered prayer and emotional support.    PLAN: No follow-up planned at this timel    Rudy Tolbert  Chaplain Resident  Pager [phone]

## 2018-11-30 NOTE — IP AVS SNAPSHOT
Luis Roe #[id number] (CSN:[id number])  (87 year old F)  (Adm: 18)     [id number]               UR 8A: [phone]            Patient Demographics     Patient Name Sex          Age SSN Address Phone    Luis Roe Female 1931 (87 year old) [id number] [facility] [address] [phone] (Home)  [phone] (Mobile)      Emergency Contact(s)     Name Relation Home Work Mobile    ROSINA MATHIS Daughter [phone]    TOBIN CASTILLO Daughter [phone]        Admission Information     Attending Provider Admitting Provider Admission Type Admission Date/Time    Beto Jenkins MD Cheng, Edward Y, MD Elective 18  0542    Discharge Date Hospital Service Auth/Cert Status Service Area     Surgery Incomplete [facility]    Unit Room/Bed Admission Status       UR 8A  Admission (Confirmed)       Admission     Complaint    Osteoarthritis Left Knee, Bilateral Knee Pain, Status post knee surgery      Hospital Account     Name Acct ID Class Status Primary Coverage    Luis Roe [id number] Inpatient Open MEDICARE - MEDICARE            Guarantor Account (for Hospital Account #[id number])     Name Relation to Pt Service Area Active? Acct Type    MayitoLuis hinton Self FCS Yes Personal/Family    Address Phone          [address] [place] [address] [phone](H)              Coverage Information (for Hospital Account #[id number])     1. MEDICARE/MEDICARE     F/O Payor/Plan Precert #    MEDICARE/MEDICARE     Subscriber Subscriber #    Luis Roe [id number]    Address Phone    ATTN CLAIMS  [address]  [facility] [place] [address] [phone]          2. [place]     F/O Payor/Plan Precert #    [place]     Subscriber Subscriber #    MayitogilbertLuis major [id number]    Address Phone    [address] [address]  [place] [address] [phone]                                                      INTERAGENCY  TRANSFER FORM - PHYSICIAN ORDERS   11/30/2018                       UR 8A: [phone]            Attending Provider: Beto Jenkins MD     Allergies:  Ace Inhibitors, No Clinical Screening - See Comments    Infection:  None   Service:  SURGERY    Ht:  1.524 m (5')   Wt:  65 kg (143 lb 4.8 oz)   Admission Wt:  65 kg (143 lb 4.8 oz)    BMI:  27.99 kg/m 2   BSA:  1.66 m 2            ED Clinical Impression     Diagnosis Description Comment Added By Time Added    Status post knee surgery [Z98.890] Status post knee surgery [Z98.890]  Heriberto Acosta MD 12/2/2018  8:54 PM      Hospital Problems as of 12/3/2018              Priority Class Noted POA    Status post knee surgery Medium  11/30/2018 Yes      Non-Hospital Problems as of 12/3/2018              Priority Class Noted    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  7/19/2012    Osteoarthritis of hip Medium  7/19/2012    DDH (developmental dysplasia of the hip) Medium  10/30/2012    Osteoporosis Medium  11/5/2012    Moderate major depression (H) Medium  11/5/2012    Hyperlipidemia Medium  11/5/2012    Embolism (H) Medium  11/7/2012    S/P hip replacement Medium  2/7/2013    Benign hypertension Medium  12/23/2015    At risk for heart disease Medium  9/21/2016    Elevated blood sugar Medium  9/17/2018    Seasonal allergies Medium  9/17/2018      Code Status History     Date Active Date Inactive Code Status Order ID Comments User Context    12/3/2018 10:06 AM  Full Code [id number]  Fely Cardozo APRN CNP Outpatient    11/30/2018  2:18 PM 12/3/2018 10:06 AM Full Code [id number]  Heriberto Acosta MD Inpatient    11/15/2012  7:58 AM 11/30/2018  2:18 PM Full Code [id number]  Levi Freitas NP Outpatient    11/4/2012 10:36 AM 11/15/2012  7:58 AM Full Code [id number]  Aric Mcfarland MD Outpatient    10/31/2012 12:08 AM 11/4/2012 10:36 AM Full Code [id number]  Vita Sanchez RN Inpatient      Current Code Status     Date Active Code Status Order ID  Comments User Context       Prior      Summary of Visit     Reason for your hospital stay       Left knee replacement surgery                Medication Review      START taking        Dose / Directions Comments    acetaminophen 325 MG tablet   Commonly known as:  TYLENOL        Dose:  650 mg   Take 2 tablets (650 mg) by mouth every 4 hours as needed for other (multimodal surgical pain management along with NSAIDS and opioid medication as indicated based on pain control and physical function.)   Quantity:  100 tablet   Refills:  1        aspirin 325 MG EC tablet   Commonly known as:  ASA   Indication:  VTE Prophylaxis        Dose:  325 mg   Take 1 tablet (325 mg) by mouth 2 times daily for 28 days   Quantity:  40 tablet   Refills:  0        HYDROmorphone 2 MG tablet   Commonly known as:  DILAUDID        For pain of 3-6 give 1 mg, for pain of 7-10 give 2 mg every 3 hours as needed.   Quantity:  30 tablet   Refills:  0        senna-docusate 8.6-50 MG tablet   Commonly known as:  SENOKOT-S/PERICOLACE        Dose:  1 tablet   Take 1 tablet by mouth 2 times daily as needed for constipation   Quantity:  100 tablet   Refills:  0          CONTINUE these medications which have NOT CHANGED        Dose / Directions Comments    amLODIPine 5 MG tablet   Commonly known as:  NORVASC        Dose:  5 mg   Take 5 mg by mouth   Refills:  0        amoxicillin 500 MG capsule   Commonly known as:  AMOXIL   Used for:  OA (osteoarthritis) of hip        Take 4 capsules one hour prior to dental work.   Quantity:  4 capsule   Refills:  1        ascorbic acid 1000 MG Tabs tablet        Take 1,000 mg by mouth once daily. Takes 2 tablets (2,000mg)   Indications: supplement   Refills:  0        calcium carbonate 600 MG tablet   Commonly known as:  OS-ANGELINA 600 mg [place]. [place]        Take 1,200 mg by mouth 2 times daily.   Refills:  0        DAILY MULTIVITAMIN PO        Refills:  0        FISH OIL PO        Dose:  1000 mg   Take 1,000 mg by mouth    Refills:  0        latanoprost 0.005 % ophthalmic solution   Commonly known as:  XALATAN        INSTILL 1 DROP INTO BOTH EYES QPM UTD   Refills:  0        niacin 250 MG tablet        Take 500 mg by mouth once daily.   Refills:  0        RA CETIRIZINE 10 MG tablet   Generic drug:  cetirizine        Dose:  10 mg   Take 10 mg by mouth   Refills:  0        TRAVATAN Z 0.004 % ophthalmic solution   Generic drug:  travoprost BAK FREE        INT 1 GTT INTO OU QPM AS DIRECTED   Refills:  0        VITAMIN D-1000 MAX ST 1000 units Tabs   Generic drug:  cholecalciferol        Take 1,000 Units by mouth once daily. Takes 2 tablets (2,000mg)   Indications: PREVENTION OF VITAMIN D DEFICIENCY   Refills:  0          STOP taking     BOSWELLIA PO           IBUPROFEN PO           Red Yeast Rice 600 MG Tabs                   After Care     Activity - Ambulate in hallway       Every shift       Activity - Up with assistive device           Activity - Up with nursing assistance           Continuous Passive Motion Machine       Start CPM Day of Surgery.  0 - 90 degrees. Advance as tolerated.       Discharge Instructions       TOTAL KNEE ARTHROPLASTY POST OPERATIVE DISCHARGE INSTRUCTIONS    FOLLOW UP APPOINTMENT  You are scheduled for a post operative appointment with Dr. Jenkins approximately four weeks after surgery.     Your follow up appointments will be at the location that you regularly see Dr. Jenkins:    [facility]  [address] [place], [place] [address] [phone]    Physical therapy:   A prescription for physical therapy will be provided at the time of discharge.      ACTIVITY  Weight bearing status:   You may bear weight on your operative extremity as tolerated, using assistive devices (walker, cane) as needed. As you begin to feel more comfortable ambulating, you may gradually transition from a walker to a cane. Eventually, you should wean from all assistive devices. Although  we would like you to discontinue use of assistive devices as soon as possible, do not transition until you have worked with your physical therapist to achieve safe balance and comfort.     Continuous passive motion machine:   Perform CPM exercises for six to eight hours per day for the first four weeks after surgery. The CPM should be set at 0 degrees to flexion tolerance with a goal of 90 degrees. Advance the CPM settings aggressively in increments of 5 degrees every 30 minutes until goal is achieved.     Exercises:   Perform the following exercises at least three times per day for the first four weeks after surgery to prevent complications, such as blood clots in your legs:  1) Point and flex your feet  2) Move your ankle around in big circles  3) Wiggle your toes   Also, perform thigh muscle tightening exercises for 10 to 15 minutes at least three times per day for the first four weeks after surgery.    Athletic Activities:  Activities such as swimming, bicycling, jogging, running, and stop-and-go sports should be avoided until permitted by your provider.    Driving:  Driving is not permitted until directed by your provider. Typically, driving is restricted for three to four weeks after right knee surgery and three weeks after left knee surgery. Under no circumstance are you permitted to drive while using narcotic pain medications.    Return to Work:  You may return to work when directed by your provider. Typically, patients with desk/sitting jobs can return to work within two weeks while patients with heavy labor jobs can return to work around three months after surgery.      COMFORT AND PAIN MANAGEMENT  Elevation:   During times of inactivity throughout the first two weeks after surgery, make an effort to decrease swelling by elevating your operative extremity. This is most effectively done by lying down and placing several pillows lengthwise under your thigh and calf to raise your toes above the level of your  nose. To ensure that you knee remains in full extension, do not place pillows directly under your knee.     Icing:  An ice pack will be provided to control swelling and discomfort after surgery. Place a thin towel on your skin and apply the ice pack overtop. You may apply ice for 20 minutes as often as two times per hour.    Pain Medications:  You will be discharged with acetaminophen (Tylenol) and a narcotic medication for pain management after surgery. Acetaminophen can help to effectively manage pain when used as prescribed, however, do not exceed the maximum daily dose of 3000 mg. The narcotic pain medication should be reserved for severe, breakthrough pain. Take the narcotic medication as prescribed and use only as often as necessary.       ANTICOAGULATION  Take warfarin as prescribed for a total of four weeks after surgery. This medication will require weekly INR checks (INR goal 1.5-2.5).       WOUND CARE AND SHOWERING  Wound care:  Keep the dressing on, clean, and dry for the first five days after surgery. You may then remove the dressing and replace it with fresh 4x4s and tubigrip (or ACE wrap). Your surgical incision was closed with a clear knotless suture and tails were left at the margins of the incision. These tails can be left in place until your follow up appointment. The steri strips (small white tape that is directly on the incision areas) should be left in place until they fall off or are removed at your first office visit.    Showering:  You may shower ten days after surgery provided your incision is intact and dry without drainage. If there is fluid at the incision site, cover the incision with a non-permeable plastic bag. You may allow water to run over the incision, but do not soak or submerge the incision. Do not scrub the incision.     Tub Bathing:  Tub bathing, swimming, or any other activities that cause your incision to be submerged should be avoided until allowed by your provider.  Typically, patients are allowed to return to these activities four weeks after surgery.      CONTACTING YOUR PHYSICIAN:  You may experience symptoms that require follow-up before your scheduled appointment. Please contact Dr. Jenkins s office if you experience:  1) Pain that persists or worsens in the first few days after surgery  2) Excessive redness or drainage of cloudy or bloody material from the wounds (clear red tinted fluid and some mild drainage should be expected) or drainage of any kind five days after surgery  3) A temperature elevation greater than 101.5    4) Pain, swelling or redness in your calf  5) Numbness or weakness in your leg or foot      Regular business hours (Monday - Friday, 8am - 5pm):  [facility]: [phone]    If you have any other concerns during normal business hours, please contact Dr. Jenkins's nurse, Clover Reed RN, at [phone] during normal business hours 8 am - 5 pm (leave message as needed). If your concern is urgent, please page Clover Reed RN at [phone] and key in your 10 digit phone number followed by the # sign after the beep.     After hours and weekends:  [facility] on call Orthopedic resident: [phone]    If you have an emergent concern, contact the Emergency Department at the [place] - [phone] - or [place] - [phone] - [facility].       Fall precautions           General info for SNF       Length of Stay Estimate: Short Term Care: Estimated # of Days <30  Condition at Discharge: Improving  Level of care:skilled   Rehabilitation Potential: Excellent  Admission H&P remains valid and up-to-date: Yes  Recent Chemotherapy: N/A  Use Nursing Home Standing Orders: Yes       Mantoux instructions       Give two-step Mantoux (PPD) Per Facility Policy Yes       Weight bearing status       As tolerated left lower extremity       Wound care (specify)       Site:   Left knee  Instructions:  As  directed below             Referrals     Occupational Therapy Adult Consult       Evaluate and treat as clinically indicated.    Reason:  ADLs after knee replacement       Physical Therapy Adult Consult       Evaluate and treat as clinically indicated.    Reason:  Mobilization/strength after knee replacement             Follow-Up Appointment Instructions     Follow Up and recommended labs and tests       Follow up with Dr. Jenkins in 4 weeks.  The following labs/tests are recommended: x rays of left knee.             Your next 10 appointments already scheduled     Jan 07, 2019  9:15 AM CST   (Arrive by 9:00 AM)   RETURN KNEE with Beto Jenkins MD   [facility] ([facility])    [address] [place] [id number] [phone]              Statement of Approval     Ordered          12/03/18 1157  I have reviewed and agree with all the recommendations and orders detailed in this document.  EFFECTIVE NOW     Approved and electronically signed by:  Fely Cardozo APRN CNP                                                 INTERAGENCY TRANSFER FORM - NURSING   11/30/2018                       UR 8A: [phone]            Attending Provider: Beto Jenkins MD     Allergies:  Ace Inhibitors, No Clinical Screening - See Comments    Infection:  None   Service:  SURGERY    Ht:  1.524 m (5')   Wt:  65 kg (143 lb 4.8 oz)   Admission Wt:  65 kg (143 lb 4.8 oz)    BMI:  27.99 kg/m 2   BSA:  1.66 m 2            Advance Directives        Scanned docmt in ACP Activity?           No scanned doc        Immunizations     Name Date      Mantoux Tuberculin Skin Test 11/05/12       ASSESSMENT     Discharge Profile Flowsheet     DISCHARGE NEEDS ASSESSMENT     Inspection under devices  Full except (identify device(s) not inspected) 12/03/18 0855    Equipment Currently Used at Home  none 12/01/18 1008   Skin WDL  ex 12/03/18 0855    Transportation Available  family or friend  "will provide 12/01/18 1008   Skin Color/Characteristics  without discoloration 12/03/18 0855    Equipment Used at Home  -- (none) 11/05/12 1142   Skin Integrity  incision(s) 12/03/18 0855    GASTROINTESTINAL (ADULT,PEDIATRIC,OB)     Additional Documentation  Drains (LDA);Incision (LDA) 11/30/18 2357    GI WDL  WDL 12/03/18 0855   Not Inspected under devices  Other (UTV under surgical dressing) 12/03/18 0855    Last Bowel Movement  12/02/18 12/03/18 0855   Skin Temperature  warm 12/03/18 0855    Passing flatus  yes 12/03/18 0855   Skin Moisture  dry 12/03/18 0855    COMMUNICATION ASSESSMENT     Skin Elasticity  quick return to original state 12/03/18 0855    Patient's communication style  spoken language (English or Bilingual) 11/29/18 0843   SAFETY      SKIN     Safety WDL  WDL 12/03/18 0855    Inspection of bony prominences  Full except (identify areas not inspected) 12/03/18 0855   All Alarms  none present 12/03/18 0855    Full except areas not inspected   Coccyx;Sacrum;Buttock, right;Buttock, left;Hip, right;Hip, left;Spine;Scapula, left;Scapula, right 12/03/18 0855                      Assessment WDL (Within Defined Limits) Definitions           Safety WDL     Effective: 09/28/15    Row Information: <b>WDL Definition:</b> Bed in low position, wheels locked; call light in reach; upper side rails up x 2; ID band on<br> <font color=\"gray\"><i>Item=AS safety wdl>>List=AS safety wdl>>Version=F14</i></font>      Skin WDL     Effective: 09/28/15    Row Information: <b>WDL Definition:</b> Warm; dry; intact; elastic; without discoloration; pressure points without redness<br> <font color=\"gray\"><i>Item=AS skin wdl>>List=AS skin wdl>>Version=F14</i></font>      Vitals     Vital Signs Flowsheet     VITAL SIGNS     Sleep  normal sleep 12/03/18 0632    Temp  99  F (37.2  C) 12/03/18 0745   PAIN ASSESSMENT/NONVERBAL ICU (ADULT)      Temp src  Oral 12/03/18 0745   Presence of Pain  Nonverbal indicators of pain present 11/30/18 " 1118    Resp  14 12/03/18 0745   Nonverbal Indicators of Pain  Moaning;Grimace 11/30/18 1118    Pulse  86 12/01/18 0743   ANALGESIA SIDE EFFECTS MONITORING      Heart Rate  90 12/03/18 0745   Side Effects Monitoring: Respiratory Quality  R 12/03/18 0632    Pulse/Heart Rate Source  Monitor 12/03/18 0745   Side Effects Monitoring: Respiratory Depth  N 12/03/18 0632    BP  114/63 12/03/18 0745   Side Effects Monitoring: Sedation Level  S 12/03/18 0632    BP Location  Right arm 12/02/18 2243   CORTEZ COMA SCALE      Patient Position  Lying 11/30/18 1321   Best Eye Response  4-->(E4) spontaneous 12/03/18 0855    OXYGEN THERAPY     Best Motor Response  6-->(M6) obeys commands 12/03/18 0855    SpO2  93 % 12/03/18 0745   Best Verbal Response  5-->(V5) oriented 12/03/18 0855    O2 Device  None (Room air) 12/03/18 0745   [place] Coma Scale Score  15 12/03/18 0855    Oxygen Delivery  -- 11/30/18 2343   HEIGHT AND WEIGHT      RESPIRATORY MONITORING     Height  1.524 m (5') 11/30/18 0602    Respiratory Monitoring (EtCO2)  39 mmHg 12/01/18 1740   Weight  65 kg (143 lb 4.8 oz) 11/30/18 0602    Integrated Pulmonary Index (IPI)  8-9 12/01/18 1740   BSA (Calculated - sq m)  1.66 11/30/18 0602    PAIN/COMFORT     BMI (Calculated)  28.04 11/30/18 0602    Patient Currently in Pain  yes 12/03/18 1015   POSITIONING      Preferred Pain Scale  CAPA (Clinically Aligned Pain Assessment) ([facility], [facility] Adults Only) 12/03/18 1015   Body Position  independently positioning 12/03/18 0855    Pain Location  Knee 12/03/18 1015   Head of Bed (HOB)  HOB at 20-30 degrees 12/03/18 0855    Pain Orientation  Left 12/03/18 1015   Positioning/Transfer Devices  pillows 12/03/18 0239    Pain Descriptors  Aching 12/03/18 1015   ECG      Pain Intervention(s)  Medication (See eMAR) 12/03/18 1015   ECG Rhythm  Normal sinus rhythm 11/30/18 1229    CLINICALLY ALIGNED PAIN ASSESSMENT (CAPA) ([facility], [facility] ADULTS ONLY)     Ectopy  None  11/30/18 1229    Comfort  tolerable with discomfort 12/03/18 1015   Lead Monitored  Lead II;V 1 11/30/18 1100    Change in Pain  getting better 12/03/18 0632   DAILY CARE      Pain Control  partially effective 12/03/18 0632   Activity Management  activity adjusted per tolerance 12/03/18 0855    Functioning  can do most things, but pain gets in the way of some 12/03/18 0632   Activity Assistance Provided  assistance, 1 person 12/03/18 0855            Patient Lines/Drains/Airways Status    Active LINES/DRAINS/AIRWAYS     Name: Placement date: Placement time: Site: Days: Last dressing change:    Peripheral IV 11/30/18 Left 11/30/18   0707      3     Incision/Surgical Site 10/30/12 Lateral;Right Hip 10/30/12       2225     Incision/Surgical Site 11/30/18 Left Knee 11/30/18   0827    3             Patient Lines/Drains/Airways Status    Active PICC/CVC     None            Intake/Output Detail Report     Date Intake     Output     Net    Shift P.O. I.V. IV Piggyback Total Urine Drains Blood Total       Day 12/02/18 0000 - 12/02/18 0659 240 -- -- 240 400 45 -- 445 -205    Steffanie 12/02/18 0700 - 12/02/18 1459 -- -- -- -- 450 -- -- 450 -450    Noc 12/02/18 1500 - 12/02/18 2359 450 -- -- 450 550 -- -- 550 -100    Day 12/03/18 0000 - 12/03/18 0659 -- -- -- -- -- -- -- -- 0    Steffanie 12/03/18 0700 - 12/03/18 1459 -- -- -- -- -- -- -- -- 0      Case Management/Discharge Planning     Case Management/Discharge Planning Flowsheet     REFERRAL INFORMATION     Equipment Used at Home  -- (none) 11/05/12 1142    Arrived From  home 11/04/12 1433   FINAL RESOURCES      LIVING ENVIRONMENT     Equipment Currently Used at Home  none 12/01/18 1008    Lives With  alone 12/01/18 0956   ABUSE RISK SCREEN      Living Arrangements  house 12/01/18 0956   QUESTION TO PATIENT:  Has a member of your family or a partner(now or in the past) intimidated, hurt, manipulated, or controlled you in any way?  no 11/30/18 0619    COPING/STRESS     QUESTION TO  PATIENT: Do you feel safe going back to the place where you are living?  yes 11/30/18 0619    Major Change/Loss/Stressor  none 11/29/18 0843   OBSERVATION: Is there reason to believe there has been maltreatment of a vulnerable adult (ie. Physical/Sexual/Emotional abuse, self neglect, lack of adequate food, shelter, medical care, or financial exploitation)?  no 11/30/18 0619    DISCHARGE PLANNING     OTHER      Transportation Available  family or friend will provide 12/01/18 1008   Are you depressed or being treated for depression?  No 12/01/18 2231                   8A: [phone]            Medication Administration Report for Luis Roe as of 12/03/18 1317   Legend:    Given Hold Not Given Due Canceled Entry Other Actions    Time Time (Time) Time  Time-Action       Inactive    Active    Linked        Medications 11/27/18 11/28/18 11/29/18 11/30/18 12/01/18 12/02/18 12/03/18    acetaminophen (TYLENOL) tablet 650 mg  Dose: 650 mg  Freq: EVERY 4 HOURS PRN Route: PO  PRN Reason: other  PRN Comment: multimodal surgical pain management along with NSAIDS and opioid medication as indicated based on pain control and physical function.  Start: 12/03/18 0000   Admin Instructions: May give first dose 4 hours after last scheduled dose of acetaminophen.  Maximum acetaminophen dose from all sources = 75 mg/kg/day not to exceed 4 grams/day.    Admin. Amount: 2 tablet (2 × 325 mg tablet)  Last Admin: 11/30/18 1252  Dispense Loc: [facility]  POC: Post-procedure        1252 (650 mg)-Given                Dose: 975 mg  Freq: EVERY 8 HOURS Route: PO  Start: 11/30/18 1115   End: 12/03/18 1114   Admin Instructions: Do not use if patient has an active opioid/acetaminophen combined analgesic product ordered for pain.  Maximum acetaminophen dose from all sources = 75 mg/kg/day not to exceed 4 grams/day.    Admin. Amount: 3 tablet (3 × 325 mg tablet)  Last Admin: 12/03/18 0426  Dispense Loc: [facility]  [facility]  Administrations Remainin  POC: Post-procedure        1847 (975 mg)-Given               0236 (975 mg)-Given       1155 (975 mg)-Given       1833 (975 mg)-Given        0238 (975 mg)-Given       1114 (975 mg)-Given       1828 (975 mg)-Given        0426 (975 mg)-Given       1114-Med Discontinued       aspirin (ASA) EC tablet 325 mg  Dose: 325 mg  Freq: 2 TIMES DAILY Route: PO  Indications Comment: VTE Prophylaxis  Start: 18   Admin Instructions: DO NOT CRUSH.    Admin. Amount: 1 tablet (1 × 325 mg tablet)  Last Admin: 18 0746  Dispense Loc: [facility] ADS [facility]  POC: Post-procedure        2005 (325 mg)-Given        0810 (325 mg)-Given       1905 (325 mg)-Given        0822 (325 mg)-Given       1940 (325 mg)-Given        0746 (325 mg)-Given       [ ]            benzocaine-menthol (CEPACOL) 15-3.6 MG lozenge 1-2 lozenge  Dose: 1-2 lozenge  Freq: EVERY 1 HOUR PRN Route: BU  PRN Reason: sore throat  PRN Comment: sore throat without fever  Start: 18 1418   Admin. Amount: 1-2 lozenge  Dispense Loc: [facility]  POC: Post-procedure               bisacodyl (DULCOLAX) Suppository 10 mg  Dose: 10 mg  Freq: DAILY Route: RE  Start: 18 0800   Admin Instructions: Start POD 1.  May discontinue if patient having bowel movement.    Admin. Amount: 1 suppository (1 × 10 mg suppository)  Last Admin: 18 0849  Dispense Loc: [facility]  POC: Post-procedure         (0811)-Not Given        0849 (10 mg)-Given        (0747)-Not Given           calcium carbonate 500 mg (elemental) (OSCAL;OYSTER SHELL CALCIUM) tablet 500 mg  Dose: 500 mg  Freq: DAILY Route: PO  Start: 18 1430   Admin Instructions: Each tablet = 500 mg elemental calcium = 1250 mg calcium carbonate.    Admin. Amount: 1 tablet (1 × 500 mg tablet)  Last Admin: 18 0745  Dispense Loc: [facility]        (1515)-Not Given [C]        0810 (500 mg)-Given        0849 (500 mg)-Given        0745 (500 mg)-Given      "      cetirizine (zyrTEC) tablet 10 mg  Dose: 10 mg  Freq: AT BEDTIME Route: PO  Start: 11/30/18 2200   Admin. Amount: 1 tablet (1 × 10 mg tablet)  Last Admin: 12/02/18 2122  Dispense Loc: [facility]        2305 (10 mg)-Given        2126 (10 mg)-Given        2122 (10 mg)-Given        [ ] 2200           HYDROmorphone (DILAUDID) half-tab 1-2 mg  Dose: 1-2 mg  Freq: EVERY 3 HOURS PRN Route: PO  PRN Reason: moderate to severe pain  Start: 12/03/18 1100   Admin Instructions: Use 2 mg dose - time 30-45 min before therapy.    Admin. Amount: 1-2 half-tab (1-2 × 1 mg half-tab)  Dispense Loc: [facility]S [facility]               lactated ringers infusion  Rate: 75 mL/hr   Freq: CONTINUOUS Route: IV  Start: 11/30/18 1430   Admin Instructions: Discontinue when patient PO intake is GREATER than 500 mL per shift.    Last Admin: 11/30/18 1613  Dispense Loc: [facility] Floor Stock  Volume: 1,000 mL  POC: Post-procedure   Current Line: Peripheral IV 11/30/18 Left       1613 ( )-Rate/Dose Verify              latanoprost (XALATAN) 0.005 % ophthalmic solution 1 drop  Dose: 1 drop  Freq: AT BEDTIME Route: Both Eyes  Start: 11/30/18 2200   Admin. Amount: 1 drop  Last Admin: 12/02/18 2123  Dispense Loc: [facility]  Volume: 2.5 mL        2307 (1 drop)-Given        2127 (1 drop)-Given        2123 (1 drop)-Given        [ ] 2200           lidocaine (LMX4) cream  Freq: EVERY 1 HOUR PRN Route: Top  PRN Reason: pain  PRN Comment: with VAD insertion or accessing implanted port.  Start: 11/30/18 1418   Admin Instructions: Do NOT give if patient has a history of allergy to any local anesthetic or any \"reji\" product.   Apply 30 minutes prior to VAD insertion or port access.  MAX Dose:  2.5 g (  of 5 g tube)    Dispense Loc: [facility]  POC: Post-procedure               lidocaine 1 % 1 mL  Dose: 1 mL  Freq: EVERY 1 HOUR PRN Route: OTHER  PRN Comment: mild pain with VAD insertion or accessing implanted port  Start: 11/30/18 1418 " "  Admin Instructions: Do NOT give if patient has a history of allergy to any local anesthetic or any \"reji\" product. MAX dose 1 mL subcutaneous OR intradermal in divided doses.    Admin. Amount: 1 mL  Dispense Loc: [facility]  Volume: 2 mL  POC: Post-procedure               metoclopramide (REGLAN) tablet 5 mg  Dose: 5 mg  Freq: EVERY 6 HOURS PRN Route: PO  PRN Reasons: nausea,vomiting  Start: 11/30/18 1418   Admin Instructions: This is Step 3 of nausea and vomiting management.  Give if nausea not resolved 15 minutes after giving prochlorperazine (COMPAZINE).  If nausea not resolved in 15-30 minutes, Notify provider.    Admin. Amount: 1 tablet (1 × 5 mg tablet)  Dispense Loc: [facility]  POC: Post-procedure              Or  metoclopramide (REGLAN) injection 5 mg  Dose: 5 mg  Freq: EVERY 6 HOURS PRN Route: IV  PRN Reasons: nausea,vomiting  Start: 11/30/18 1418   Admin Instructions: This is Step 3 of nausea and vomiting management.  Give if nausea not resolved 15 minutes after giving prochlorperazine (COMPAZINE).  If nausea not resolved in 15-30 minutes, Notify provider.  Avoid use if patient has full bowel obstruction or perforation. Irritant. For ordered IV doses 1-10 mg, give IV Push undiluted over 2 minutes.    Admin. Amount: 5 mg = 1 mL Conc: 5 mg/mL  Dispense Loc: [facility]  Infused Over: 2 Minutes  Volume: 2 mL  POC: Post-procedure               multivitamin w/minerals (THERA-VIT-M) tablet 1 tablet  Dose: 1 tablet  Freq: DAILY Route: PO  Start: 11/30/18 1615   Admin. Amount: 1 tablet  Last Admin: 12/03/18 0745  Dispense Loc: [facility]        (1615)-Not Given [C]        0810 (1 tablet)-Given        0849 (1 tablet)-Given        0745 (1 tablet)-Given           naloxone (NARCAN) injection 0.1-0.4 mg  Dose: 0.1-0.4 mg  Freq: EVERY 2 MIN PRN Route: IV  PRN Reason: opioid reversal  Start: 11/30/18 1113   Admin Instructions: For respiratory rate LESS than or EQUAL to 8.  Partial " reversal dose:  0.1 mg titrated q 2 minutes for Analgesia Side Effects Monitoring Sedation Level of 3 (frequently drowsy, arousable, drifts to sleep during conversation).Full reversal dose:  0.4 mg bolus for Analgesia Side Effects Monitoring Sedation Level of 4 (somnolent, minimal or no response to stimulation).  For ordered IV doses 0.1-2mg give IVP. Give each 0.4mg over 15 seconds in emergency situations. For non-emergent situations further dilute in 9mL of NS to facilitate titration of response.    Admin. Amount: 0.1-0.4 mg = 0.25-1 mL Conc: 0.4 mg/mL  Dispense Loc: [facility]  Volume: 1 mL  POC: Post-procedure               ondansetron (ZOFRAN-ODT) ODT tab 4 mg  Dose: 4 mg  Freq: EVERY 6 HOURS PRN Route: PO  PRN Reasons: nausea,vomiting  Start: 11/30/18 1418   Admin Instructions: This is Step 1 of nausea and vomiting management.  If nausea not resolved in 15 minutes, go to Step 2 prochlorperazine (COMPAZINE). Do not push through foil backing. Peel back foil and gently remove. Place on tongue immediately. Administration with liquid unnecessary  With dry hands, peel back foil backing and gently remove tablet; do not push oral disintegrating tablet through foil backing; administer immediately on tongue and oral disintegrating tablet dissolves in seconds; then swallow with saliva; liquid not required.    Admin. Amount: 1 tablet (1 × 4 mg tablet)  Dispense Loc: [facility]  POC: Post-procedure              Or  ondansetron (ZOFRAN) injection 4 mg  Dose: 4 mg  Freq: EVERY 6 HOURS PRN Route: IV  PRN Reasons: nausea,vomiting  Start: 11/30/18 1418   Admin Instructions: This is Step 1 of nausea and vomiting management.  If nausea not resolved in 15 minutes, go to Step 2 prochlorperazine (COMPAZINE).  Irritant. For ordered IV doses 0.1-4 mg, give IV Push undiluted over 2-5 minutes.    Admin. Amount: 4 mg = 2 mL Conc: 4 mg/2 mL  Dispense Loc: [facility]  Infused Over: 2-5 Minutes  Volume: 2 mL  POC:  Post-procedure               prochlorperazine (COMPAZINE) injection 5 mg  Dose: 5 mg  Freq: EVERY 6 HOURS PRN Route: IV  PRN Reasons: nausea,vomiting  Start: 11/30/18 1418   Admin Instructions: This is Step 2 of nausea and vomiting management.   If nausea not resolved in 15 minutes, give metoclopramide (REGLAN) if ordered (step 3 of nausea and vomiting management)  For ordered IV doses 0.1-10 mg, give IV Push undiluted. Each 5mg over 1 minute.    Admin. Amount: 5 mg = 1 mL Conc: 5 mg/mL  Dispense Loc: [facility]  Infused Over: 1-2 Minutes  Volume: 1 mL  POC: Post-procedure              Or  prochlorperazine (COMPAZINE) tablet 5 mg  Dose: 5 mg  Freq: EVERY 6 HOURS PRN Route: PO  PRN Reasons: nausea,vomiting  Start: 11/30/18 1418   Admin Instructions: This is Step 2 of nausea and vomiting management.   If nausea not resolved in 15 minutes, give metoclopramide (REGLAN) if ordered (step 3 of nausea and vomiting management)    Admin. Amount: 1 tablet (1 × 5 mg tablet)  Dispense Loc: [facility]  POC: Post-procedure               senna-docusate (SENOKOT-S/PERICOLACE) 8.6-50 MG per tablet 1 tablet  Dose: 1 tablet  Freq: 2 TIMES DAILY Route: PO  Start: 11/30/18 1418   Admin Instructions: If no bowel movement in 24 hours, increase to 2 tablets PO.  Hold for loose stools.    Admin. Amount: 1 tablet  Last Admin: 12/02/18 0849  Dispense Loc: [facility]  POC: Post-procedure        2005 (1 tablet)-Given                       0849 (1 tablet)-Given       (1944)-Not Given               [ ] 2000          Or  senna-docusate (SENOKOT-S/PERICOLACE) 8.6-50 MG per tablet 2 tablet  Dose: 2 tablet  Freq: 2 TIMES DAILY Route: PO  Start: 11/30/18 1418   Admin Instructions: Hold for loose stools.    Admin. Amount: 2 tablet  Last Admin: 12/03/18 0745  Dispense Loc: [facility]  POC: Post-procedure                0811 (2 tablet)-Given       1906 (2 tablet)-Given                       0745 (2 tablet)-Given       [ ]  2000           sodium chloride (PF) 0.9% PF flush 3 mL  Dose: 3 mL  Freq: EVERY 8 HOURS Route: IK  Start: 18 1430   Admin Instructions: And Q1H PRN, to lock peripheral IV dormant line.    Admin. Amount: 3 mL  Last Admin: 18 0748  Dispense Loc: [facility] Floor Stock  Volume: 3 mL  POC: Post-procedure   Current Line: Peripheral IV 18 Left       (1530)-Not Given               0811 (3 mL)-Given       1442 (3 mL)-Given       2131 (3 mL)-Given        0707 (3 mL)-Given              2227 (3 mL)-Given        0748 (3 mL)-Given       [ ] 1430       [ ] 2230           sodium chloride (PF) 0.9% PF flush 3 mL  Dose: 3 mL  Freq: EVERY 1 HOUR PRN Route: IK  PRN Reason: line flush  PRN Comment: for peripheral IV flush post IV meds  Start: 18 1418   Admin. Amount: 3 mL  Dispense Loc: [facility] Floor Stock  Volume: 3 mL  POC: Post-procedure               vitamin D3 (CHOLECALCIFEROL) 1000 units (25 mcg) tablet 1,000 Units  Dose: 1,000 Units  Freq: DAILY Route: PO  Start: 18 0800   Admin. Amount: 1 tablet (1 × 1,000 Units tablet)  Last Admin: 18 0745  Dispense Loc: [facility]         0810 (1,000 Units)-Given        0849 (1,000 Units)-Given        0745 (1,000 Units)-Given          Completed Medications  Medications 18         Dose: 1 g  Freq: EVERY 8 HOURS Route: IV  Indications of Use: PERIOPERATIVE PHARMACOPROPHYLAXIS  Start: 18 1800   End: 18 0306   Admin Instructions: First post-op dose due 8 hours after intra-op dose, see eMAR.    Admin. Amount: 1 g  Last Admin: 18 0236  Dispense Loc: [facility]  Infused Over: 30 Minutes  Administrations Remainin  POC: Post-procedure   Current Line: Peripheral IV 18 Left       1848 (1 g)-New Bag        0236 (1 g)-New Bag            Discontinued Medications  Medications 18         Dose: 5 mg  Freq: DAILY  Route: PO  Start: 12/01/18 0800   End: 11/30/18 1613   Admin. Amount: 1 tablet (1 × 5 mg tablet)  Dispense Loc: [facility] ADS [facility]        1613-Med Discontinued            Dose: 10 mg  Freq: DAILY Route: PO  Start: 11/30/18 1430   End: 11/30/18 1613   Admin. Amount: 1 tablet (1 × 10 mg tablet)  Dispense Loc: [facility]        (1515)-Not Given [C]       1613-Med Discontinued            Dose: 25-50 mcg  Freq: EVERY 2 MIN PRN Route: IV  PRN Reason: other  PRN Comment: acute pain  Start: 11/30/18 1005   End: 11/30/18 1409   Admin Instructions: MAX cumulative dose = 250 mcg.   Use fentaNYL (SUBLIMAZE) initially, as a short acting agent for acute pain control. If insufficient, or a longer acting agent is needed, begin morphine or HYDROmorphone (DILAUDID) if ordered.  For ordered IV doses 1-100 mcg give IV Push undiluted over a minimum of 3-5 minutes.    Admin. Amount: 25-50 mcg = 0.5-1 mL Conc: 50 mcg/mL  Last Admin: 11/30/18 1102  Dispense Loc: [facility] ADS ANES-OR1  Volume: 2 mL  POC: PACU   Current Line: Peripheral IV 11/30/18 Left       1053 (25 mcg)-Given       1058 (25 mcg)-Given       1102 (50 mcg)-Given       1409-Med Discontinued            Dose: 1 mg  Freq: EVERY 4 HOURS PRN Route: PO  PRN Reason: moderate to severe pain  Start: 11/30/18 1610   End: 12/01/18 1007   Admin. Amount: 1 half-tab (1 × 1 mg half-tab)  Last Admin: 12/01/18 0849  Dispense Loc: [facility]         0236 (1 mg)-Given       0849 (1 mg)-Given       1007-Med Discontinued           Dose: 1-2 mg  Freq: EVERY 4 HOURS PRN Route: PO  PRN Reason: moderate to severe pain  Start: 12/01/18 1006   End: 12/03/18 1054   Admin Instructions: Use 2 mg dose - time 30-45 min before therapy.    Admin. Amount: 1-2 half-tab (1-2 × 1 mg half-tab)  Last Admin: 12/03/18 1011  Dispense Loc: [facility] AD[facility]         1016 (1 mg)-Given       1435 (1 mg)-Given       1833 (1 mg)-Given       1905 (1 mg)-Given       2226 (2 mg)-Given        0238 (2  mg)-Given       0703 (1 mg)-Given       0821 (1 mg)-Given       (1208)-Not Given       1333 (1 mg)-Given       1442 (1 mg)-Given       1828 (2 mg)-Given       2226 (2 mg)-Given        0426 (2 mg)-Given       1011 (2 mg)-Given       1054-Med Discontinued         Dose: 0.3-0.5 mg  Freq: EVERY 5 MIN PRN Route: IV  PRN Reason: other  PRN Comment: acute pain.  May administer if Respiratory Rate is greater than 10  Start: 11/30/18 1005   End: 11/30/18 1409   Admin Instructions: Max cumulative dose = 2 mg  If fentaNYL (SUBLIMAZE) is also ordered, use HYDROmorphone (DILAUDID) if pain control insufficient with fentaNYL (SUBLIMAZE) or a longer acting agent is needed.  For ordered IV doses 0.1-4 mg give IV Push undiluted. Administer each 2mg over 2-5 minutes.    Admin. Amount: 0.3-0.5 mg  Last Admin: 11/30/18 1118  Dispense Loc: [facility] ADS ANES-OR1  POC: PACU   Current Line: Peripheral IV 11/30/18 Left       1118 (0.3 mg)-Given       1409-Med Discontinued            Rate: 100 mL/hr   Freq: CONTINUOUS Route: IV  Start: 11/30/18 1015   End: 11/30/18 1409   Admin Instructions: Continue until IV catheter is weaned    Dispense Loc: [facility] Floor Stock  Volume: 1,000 mL  POC: PACU               1409-Med Discontinued            Dose: 0.1-0.4 mg  Freq: EVERY 2 MIN PRN Route: IV  PRN Reason: opioid reversal  Start: 11/30/18 1005   End: 11/30/18 1418   Admin Instructions: For apnea or imminent respiratory arrest: give 0.4 mg IV undiluted Q 2 minutes PRN until desired degree of reversal is obtained, stop opioid and notify provider. Continue monitoring until discharge criteria are met for a minimum of 2 hours.  For severe sedation, decrease in respiratory depth, quality or respiratory rate less than 8: give 0.1 mg IV Q 2 minutes x 3 doses, stop opioid and notify provider.  Try to minimize reversal of analgesia especially in end-of-life patients  For ordered IV doses 0.1-2mg give IVP. Give each 0.4mg over 15 seconds in emergency  situations. For non-emergent situations further dilute in 9mL of NS to facilitate titration of response.    Admin. Amount: 0.1-0.4 mg = 0.25-1 mL Conc: 0.4 mg/mL  Dispense Loc: [facility]  Volume: 1 mL  POC: Post-procedure        1418-Med Discontinued            Dose: 4 mg  Freq: EVERY 30 MIN PRN Route: PO  PRN Reason: nausea  Start: 18   End: 18   Admin Instructions: MAX total dose = 8 mg, including OR dosing. If not resolved in 15 minutes, then go to step 2 [prochlorperazine (COMPAZINE), if ordered].  With dry hands, peel back foil backing and gently remove tablet; do not push oral disintegrating tablet through foil backing; administer immediately on tongue and oral disintegrating tablet dissolves in seconds; then swallow with saliva; liquid not required.    Admin. Amount: 1 tablet (1 × 4 mg tablet)  Dispense Loc: [facility]  Administrations Remainin  POC: PACU        1409-Med Discontinued         Or    Dose: 4 mg  Freq: EVERY 30 MIN PRN Route: IV  PRN Reason: nausea  Start: 18   End: 18   Admin Instructions: MAX total dose = 8 mg, including OR dosing. If not resolved in 15 minutes, then go to step 2 [prochlorperazine (COMPAZINE), if ordered].  Irritant. For ordered IV doses 0.1-4 mg, give IV Push undiluted over 2-5 minutes.    Admin. Amount: 4 mg = 2 mL Conc: 4 mg/2 mL  Dispense Loc: [facility] ADS PREOP/PACU  Infused Over: 2-5 Minutes  Administrations Remainin  Volume: 2 mL  POC: PACU        1409-Med Discontinued            Dose: 5-10 mg  Freq: EVERY 4 HOURS PRN Route: PO  PRN Reason: other  PRN Comment: pain control or improvement in physical function. Hold dose for analgesic side effects.  Start: 18   End: 18   Admin Instructions: Start with the lowest dose. May adjust dose by 5 mg every 4 hours as needed. Notify provider to assess for uncontrolled pain or analgesic side effects. Hold while on PCA or with regular IV  opioid dosing. Maximum total is 60 mg in 24 hours.    Admin. Amount: 1-2 tablet (1-2 × 5 mg tablet)  Dispense Loc: [facility]  POC: Post-procedure        1613-Med Discontinued            Dose: 5 mg  Freq: EVERY 6 HOURS PRN Route: IV  PRN Reasons: nausea,vomiting  Start: 18 1005   End: 18   Admin Instructions: This is Step 2 of the nausea and vomiting protocol.   If nausea not resolved in 15 minutes, give metoclopramide (REGLAN) if ordered (step 3 of nausea and vomiting protocol)  For ordered IV doses 0.1-10 mg, give IV Push undiluted. Each 5mg over 1 minute.    Admin. Amount: 5 mg = 1 mL Conc: 5 mg/mL  Dispense Loc: [facility]  Infused Over: 1-2 Minutes  Volume: 1 mL  POC: PACU        1409-Med Discontinued            Freq: PRN  Start: 18   End: 18   Last Admin: 18  Volume: 50 mL  POC: Intra-procedure   Mixture Administration Information:   Medication Type Amount   ropivacaine 10 MG/ML SOLN Medications 200 mg   ketorolac 15 MG/ML SOLN Medications 15 mg   morphine 10 MG/ML SOLN Medications 2.5 mg   EPINEPHrine 1 MG/ML SOLN Medications 0.3 mg   sodium chloride 0.9 % SOLN QS Base 28.45 mL                0942 ( )-Given [C]       1409-Med Discontinued            Freq: ONCE Route: IX  Start: 18 1030   End: 18   Dispense Loc: [facility]  Administrations Remainin  Volume: 50 mL  POC: Intra-procedure   Mixture Administration Information:   Medication Type Amount   ropivacaine 5 MG/ML SOLN Medications 200 mg   EPINEPHrine 1 MG/ML SOLN Medications 300 mcg   ketorolac 30 MG/ML SOLN Medications 15 mg   morphine 10 MG/ML SOLN Medications 2.5 mg   sodium chloride 0.9 % SOLN QS Base 8.95 mL                       1409-Med Discontinued            Freq: PRN  Start: 18   End: 18   Last Admin: 18  POC: Intra-procedure        0959 (1,000 mL)-Given       1409-Med Discontinued            Freq: PRN  Start:  11/30/18 0959   End: 11/30/18 1409   Last Admin: 11/30/18 0959  POC: Intra-procedure        0959 (400 mL)-Given       1409-Med Discontinued       Medications 11/27/18 11/28/18 11/29/18 11/30/18 12/01/18 12/02/18 12/03/18               INTERAGENCY TRANSFER FORM - NOTES (H&P, Discharge Summary, Consults, Procedures, Therapies)   11/30/2018                       UR 8A: [phone]               History & Physicals      H&P signed by Rios Felipe at 11/29/2018 12:03 PM      Author:  Rios Felipe Service:  (none) Author Type:  Physician    Filed:  11/29/2018 12:03 PM Date of Service:  11/29/2018 11:33 AM Creation Time:  11/29/2018 12:03 PM    Status:  Signed :  Rios Felipe (Physician)     Scan on 11/29/2018 12:03 PM by Scan, Provider : [employer] H/P, 11/27/2018 1          Revision History        User Key Date/Time User Provider Type Action    > [N/A] 11/29/2018 12:03 PM Destniee Provider Physician Sign                     Discharge Summaries      Discharge Summaries by Heriberto Acosta MD at 12/3/2018 12:37 PM     Author:  Heriberto Acosta MD Service:  Orthopedics Author Type:  Resident    Filed:  12/3/2018 12:37 PM Date of Service:  12/3/2018 12:37 PM Creation Time:  12/2/2018  8:58 PM    Status:  Cosign Needed :  Heriberto Acosta MD (Resident)    Cosign Required:  Yes             ORTHOPAEDIC DISCHARGE SUMMARY     Date of Admission: 11/30/2018  Date of Discharge:[SR1.1] 12-[MB1.1]3[SR1.2]-2018[MB1.1]  Disposition:[SR1.1] TCU[MB1.1]  Staff Physician: Beto Jenkins MD  Primary Care Provider: [facility] Lambert    DISCHARGE DIAGNOSIS:    Osteoarthritis Left Knee, Bilateral Knee Pain    PROCEDURES: Procedure(s):  Left Total Knee Replacement on 11/30/2018    BRIEF HISTORY:   Luis Roe is a 87 year old female with known bilateral knee osteoarthritis. She has a history of right total hip arthroplasty with pelvis reconstruction using autograft bone on 10/30/2012. She has had progressive knee  pain over the past 2-3 years. She attempted conservative management in the form of corticosteroid injections, OTC pain medications, and activity modification. She ultimately failed conservative management. The risks and benefits of total knee arthroplasty were discussed with the patient and she elected to proceed. Signed informed consent was obtained and placed in the chart.     HOSPITAL COURSE:    Surgery was uncomplicated. Luis Roe has done well post-operatively. Medicine was consulted post operatively to aid in management of medical comorbidities. The patient received routine nursing cares and is medically stable. Vital signs are stable. The patient is tolerating a regular diet without GI distress/nausea or vomiting. Voiding spontaneously. All PT/OT goals have been met for safe mobility. Pain is now controlled on oral medications. Stool softeners have been used while taking pain medications to help prevent constipation. Luis Roe is deemed medically safe to discharge to TCU.     Antibiotics:  given periop and 24 hours postop.   DVT prophylaxis:  Aspirin 325 mg BID for 4 weeks  PT Progress: PT/OT goals ongoing  Pain Meds:  She was weaned off all IV pain meds by discharge.  Inpatient Events: No significant events or complications.     Discharge orders and instructions as below.    FOLLOWUP:      Future Appointments4 weeks post op with Dr. Jenkins.[SR1.1]  An appointment has been requested.[MB1.1]     Appointments at [facility]. Call [phone] if you haven't heard regarding these appointments within 7 days of discharge.    PLANNED DISCHARGE ORDERS:[SR1.1]        Current Discharge Medication List      START taking these medications    Details   acetaminophen (TYLENOL) 325 MG tablet Take 2 tablets (650 mg) by mouth every 4 hours as needed for other (multimodal surgical pain management along with NSAIDS and opioid medication as indicated based on pain control and physical  function.)  Qty: 100 tablet, Refills: 1    Associated Diagnoses: Status post knee surgery      aspirin (ASA) 325 MG EC tablet Take 1 tablet (325 mg) by mouth 2 times daily for 28 days  Qty: 40 tablet    Associated Diagnoses: Status post knee surgery      HYDROmorphone (DILAUDID) 2 MG tablet For pain of 3-6 give 1 mg, for pain of 7-10 give 2 mg every 3 hours as needed.  Qty: 30 tablet, Refills: 0    Associated Diagnoses: Status post knee surgery      senna-docusate (SENOKOT-S/PERICOLACE) 8.6-50 MG tablet Take 1 tablet by mouth 2 times daily as needed for constipation  Qty: 100 tablet    Associated Diagnoses: Status post knee surgery         CONTINUE these medications which have NOT CHANGED    Details   amLODIPine (NORVASC) 5 MG tablet Take 5 mg by mouth      amoxicillin (AMOXIL) 500 MG capsule Take 4 capsules one hour prior to dental work.  Qty: 4 capsule, Refills: 1    Associated Diagnoses: OA (osteoarthritis) of hip      calcium carbonate (OS-ANGELINA 600 MG [place]. [place]) 600 MG tablet Take 1,200 mg by mouth 2 times daily.      cetirizine (RA CETIRIZINE) 10 MG tablet Take 10 mg by mouth      latanoprost (XALATAN) 0.005 % ophthalmic solution INSTILL 1 DROP INTO BOTH EYES QPM UTD      Multiple Vitamin (DAILY MULTIVITAMIN PO)       travoprost, BAK Free, (TRAVATAN Z) 0.004 % ophthalmic solution INT 1 GTT INTO OU QPM AS DIRECTED      ascorbic acid 1000 MG TABS tablet Take 1,000 mg by mouth once daily. Takes 2 tablets (2,000mg)   Indications: supplement      cholecalciferol (VITAMIN D-1000 MAX ST) 1000 units TABS Take 1,000 Units by mouth once daily. Takes 2 tablets (2,000mg)   Indications: PREVENTION OF VITAMIN D DEFICIENCY      niacin 250 MG tablet Take 500 mg by mouth once daily.      Omega-3 Fatty Acids (FISH OIL PO) Take 1,000 mg by mouth         STOP taking these medications       Boswellia Jp (BOSWELLIA PO) Comments:   Reason for Stopping:         IBUPROFEN PO Comments:   Reason for Stopping:         Red Yeast Rice  600 MG TABS Comments:   Reason for Stopping:                 Discharge Procedure Orders  General info for SNF   Order Comments: Length of Stay Estimate: Short Term Care: Estimated # of Days <30  Condition at Discharge: Improving  Level of care:skilled   Rehabilitation Potential: Excellent  Admission H&P remains valid and up-to-date: Yes  Recent Chemotherapy: N/A  Use Nursing Home Standing Orders: Yes     Mantoux instructions   Order Comments: Give two-step Mantoux (PPD) Per Facility Policy Yes     Reason for your hospital stay   Order Comments: Left knee replacement surgery     Wound care (specify)   Order Comments: Site:   Left knee  Instructions:  As directed below     Follow Up and recommended labs and tests   Order Comments: Follow up with Dr. Jenkins in 4 weeks.  The following labs/tests are recommended: x rays of left knee.     Activity - Up with assistive device   Order Specific Question Answer Comments   Is discharge order? Yes      Activity - Up with nursing assistance   Order Specific Question Answer Comments   Is discharge order? Yes      Activity - Ambulate in hallway   Order Comments: Every shift   Order Specific Question Answer Comments   Is discharge order? Yes      Weight bearing status   Order Comments: As tolerated left lower extremity     Continuous Passive Motion Machine   Order Comments: Start CPM Day of Surgery.  0 - 90 degrees. Advance as tolerated.     Discharge Instructions   Order Comments: TOTAL KNEE ARTHROPLASTY POST OPERATIVE DISCHARGE INSTRUCTIONS    FOLLOW UP APPOINTMENT  You are scheduled for a post operative appointment with Dr. Jenkins approximately four weeks after surgery.     Your follow up appointments will be at the location that you regularly see Dr. Jenkins:    [facility]  [address] [place] [id number] [phone]    Physical therapy:   A prescription for physical therapy will be provided at the time of  discharge.      ACTIVITY  Weight bearing status:   You may bear weight on your operative extremity as tolerated, using assistive devices (walker, cane) as needed. As you begin to feel more comfortable ambulating, you may gradually transition from a walker to a cane. Eventually, you should wean from all assistive devices. Although we would like you to discontinue use of assistive devices as soon as possible, do not transition until you have worked with your physical therapist to achieve safe balance and comfort.     Continuous passive motion machine:   Perform CPM exercises for six to eight hours per day for the first four weeks after surgery. The CPM should be set at 0 degrees to flexion tolerance with a goal of 90 degrees. Advance the CPM settings aggressively in increments of 5 degrees every 30 minutes until goal is achieved.     Exercises:   Perform the following exercises at least three times per day for the first four weeks after surgery to prevent complications, such as blood clots in your legs:  1) Point and flex your feet  2) Move your ankle around in big circles  3) Wiggle your toes   Also, perform thigh muscle tightening exercises for 10 to 15 minutes at least three times per day for the first four weeks after surgery.    Athletic Activities:  Activities such as swimming, bicycling, jogging, running, and stop-and-go sports should be avoided until permitted by your provider.    Driving:  Driving is not permitted until directed by your provider. Typically, driving is restricted for three to four weeks after right knee surgery and three weeks after left knee surgery. Under no circumstance are you permitted to drive while using narcotic pain medications.    Return to Work:  You may return to work when directed by your provider. Typically, patients with desk/sitting jobs can return to work within two weeks while patients with heavy labor jobs can return to work around three months after surgery.      COMFORT  AND PAIN MANAGEMENT  Elevation:   During times of inactivity throughout the first two weeks after surgery, make an effort to decrease swelling by elevating your operative extremity. This is most effectively done by lying down and placing several pillows lengthwise under your thigh and calf to raise your toes above the level of your nose. To ensure that you knee remains in full extension, do not place pillows directly under your knee.     Icing:  An ice pack will be provided to control swelling and discomfort after surgery. Place a thin towel on your skin and apply the ice pack overtop. You may apply ice for 20 minutes as often as two times per hour.    Pain Medications:  You will be discharged with acetaminophen (Tylenol) and a narcotic medication for pain management after surgery. Acetaminophen can help to effectively manage pain when used as prescribed, however, do not exceed the maximum daily dose of 3000 mg. The narcotic pain medication should be reserved for severe, breakthrough pain. Take the narcotic medication as prescribed and use only as often as necessary.       ANTICOAGULATION  Take warfarin as prescribed for a total of four weeks after surgery. This medication will require weekly INR checks (INR goal 1.5-2.5).       WOUND CARE AND SHOWERING  Wound care:  Keep the dressing on, clean, and dry for the first five days after surgery. You may then remove the dressing and replace it with fresh 4x4s and tubigrip (or ACE wrap). Your surgical incision was closed with a clear knotless suture and tails were left at the margins of the incision. These tails can be left in place until your follow up appointment. The steri strips (small white tape that is directly on the incision areas) should be left in place until they fall off or are removed at your first office visit.    Showering:  You may shower ten days after surgery provided your incision is intact and dry without drainage. If there is fluid at the incision  site, cover the incision with a non-permeable plastic bag. You may allow water to run over the incision, but do not soak or submerge the incision. Do not scrub the incision.     Tub Bathing:  Tub bathing, swimming, or any other activities that cause your incision to be submerged should be avoided until allowed by your provider. Typically, patients are allowed to return to these activities four weeks after surgery.      CONTACTING YOUR PHYSICIAN:  You may experience symptoms that require follow-up before your scheduled appointment. Please contact Dr. Jenkins s office if you experience:  1) Pain that persists or worsens in the first few days after surgery  2) Excessive redness or drainage of cloudy or bloody material from the wounds (clear red tinted fluid and some mild drainage should be expected) or drainage of any kind five days after surgery  3) A temperature elevation greater than 101.5    4) Pain, swelling or redness in your calf  5) Numbness or weakness in your leg or foot      Regular business hours (Monday - Friday, 8am - 5pm):  [facility]: [phone]    If you have any other concerns during normal business hours, please contact Dr. Jenkins's nurse, Clover Reed RN, at [phone] during normal business hours 8 am - 5 pm (leave message as needed). If your concern is urgent, please page Clover Reed RN at [phone] and key in your 10 digit phone number followed by the # sign after the beep.     After hours and weekends:  [facility] on call Orthopedic resident: [phone]    If you have an emergent concern, contact the Emergency Department at the [facility] - [phone] - or [place] - [phone] - [facility].     Full Code   Order Specific Question Answer Comments   Code status determined by: Discussion with patient/legal decision maker      Physical Therapy Adult Consult   Order Comments: Evaluate and treat as clinically  indicated.    Reason:  Mobilization/strength after knee replacement     Occupational Therapy Adult Consult   Order Comments: Evaluate and treat as clinically indicated.    Reason:  ADLs after knee replacement     Fall precautions     Assign Questionnaire Series to Patient[MB1.2]           Discussed with Dr. Jenkins on the date of discharge.     Heriberto Acosta MD  PGY-4  Orthopaedic Surgery  [phone][SR1.1]    Discharge summary updated by me to reflect changes in plan/mediciation.[MB1.1]            Revision History        User Key Date/Time User Provider Type Action    > SR1.2 12/3/2018 12:37 PM Heriberto Acosta MD Resident Sign     MB1.2 12/3/2018 11:46 AM Fely Cardozo APRN CNP Nurse Practitioner Sign     MB1.1 12/3/2018 11:33 AM Fely Cardozo APRN CNP Nurse Practitioner      SR1.1 2018  9:00 PM Heriberto Acosta MD Resident Share                     Consult Notes      Consults by Yajaira Boo LSW at 2018 10:36 AM     Author:  Yajaira Boo LSW Service:  Social Work Author Type:      Filed:  2018 10:59 AM Date of Service:  2018 10:36 AM Creation Time:  2018 10:36 AM    Status:  Signed :  Yajaira Boo LSW ()     Consult Orders:    1. Social Work IP Consult [[id number]] ordered by Heriberto Acosta MD at 18 1047                Social Work: Assessment with Discharge Plan    Patient Name:  Luis Roe  :  1931  Age:  87 year old  MRN:  [id number]  Risk/Complexity Score:     Completed assessment with:  Patient, EMR    Presenting Information   Reason for Referral:  Discharge plan  Date of Intake:  2018  Referral Source:  Physician  Decision Maker:  Patient  Alternate Decision Maker:  NOK (adult children)  Health Care Directive:  Provided education  Living Situation:  House  Previous Functional Status:  Independent  Patient and family understanding of hospitalization:  Left TK  Replacement  Cultural/Language/Spiritual Considerations:  No language cultural considerations noted/ Jain nadiya is important to patient  Adjustment to Illness:  Patient in good spirits and motivated to recover quickly    Physical Health  Reason for Admission:[MM1.1]  No diagnosis found.[MM1.2]  Services Needed/Recommended:  TCU    Mental Health/Chemical Dependency  Diagnosis:  N/A  Support/Services in Place:  N/A  Services Needed/Recommended:  N/A    Support System  Significant relationship at present time:  Adult children  Family of origin is available for support:  Grandson currently lives in home with patient  Other support available:  Adult son willing to assist in home as well after TCU stay  Gaps in support system:  None noted at this time  Patient is caregiver to:  None     Provider Information   Primary Care Physician:  Los Lambert   [phone]   Clinic:  [facility] [address] [facility] [address] / [facility] 55*      :  Unknown    Financial   Income Source:  Social Security/ Pension  Financial Concerns:  No financial issue  Insurance:    Payor/Plan Subscriber Name Rel Member # Group #   MEDICARE - MEDICARE YOLANDAMARVIN HARRY  [id number]       ATTN CLAIMS, [address]   BCBS - [place]BS OF [place] YOLANDAJIMENA HARRYKY  [id number]      [address] [address]       Discharge Plan   Patient and family discharge goal:  PStient willing to discharge to rehab, prefers [facility] in [place]  Provided education on discharge plan:  YES  Patient agreeable to discharge plan:  YES  A list of Medicare Certified Facilities was provided to the patient and/or family to encourage patient choice. Patient's choices for facility are:  Pt preferred facility is [facility] where she had been in TCU 6 years ago, referral sent; Next closest TCU is in [place] which pt declined  Will NH provide Skilled rehabilitation or complex medical:  YES  General information regarding anticipated insurance coverage and  possible out of pocket cost was discussed. Patient and patient's family are aware patient may incur the cost of transportation to the facility, pending insurance payment: YES  Barriers to discharge:  Medical stability, accepting facility    Discharge Recommendations   Anticipated Disposition:  Facility:  Critical access hospital  Transportation Needs:  Family:  grandson and son will be at bedside to transport when discharge scheduled  Name of Transportation Company and Phone:  N/A    Additional comments   Patient agreeable to rehab stay and understands she will possibly need PT/OT through home care to support transition home later on. Pt is understanding of plan and will await discharge notification. SW sent referral to Kalani ([phone]) at [facility] and left message as no one available to discuss referrals over weekend.    Yajaira DURAN  Weekend SW  Pager: [phone]  On Call Pager: [phone] 4pm - Midnight[MM1.1]       Revision History        User Key Date/Time User Provider Type Action    > MM1.2 12/1/2018 10:59 AM Yajaira Boo LSW  Sign     MM1.1 12/1/2018 10:36 AM Yajaira Boo LSW              Consults signed by Bob Dennis MD at 12/1/2018  6:46 AM      Author:  Bob Dennis MD Service:  Hospitalist Author Type:  Physician    Filed:  12/1/2018  6:46 AM Date of Service:  11/30/2018  4:05 PM Creation Time:  11/30/2018  5:42 PM    Status:  Signed :  Bob Dennis MD (Physician)     Consult Orders:    1. [facility]: Patient to be seen: Routine within 24 hrs; Call back #: [phone]; post operative medical co-management; Consultant may enter orders: Yes [[id number]] ordered by Heriberto Acosta MD at 11/30/18 1047                Consult Date:  11/30/2018      INTERNAL MEDICINE CONSULTATION      ATTENDING PHYSICIAN:   Beto Jenkins MD      HISTORY OF PRESENT ILLNESS:  This is a pleasant  87-year-old female who underwent left total knee arthroplasty earlier today by Dr. Beto Jenkins from Orthopedic Surgery.  We were asked to see the patient postoperatively for Internal Medicine consultation to include assistance with perioperative fluid and pain management as well as to address status and intervene with regard to known history of essential hypertension and prediabetes.      The patient with osteoarthritis of the knees bilaterally, more severe on the right, however, increased pain on the left.  Cortisone injection to the right knee with reduction in degree of discomfort.  Decision to proceed with a left total knee arthroplasty earlier today (as above).  Procedure tolerated well under general anesthesia.  Transient drop in blood pressure to 70 systolic.  Estimated blood loss 25 mL. Urine output 300 mL. Received 1300 mL of lactated Ringer's.  Tolerable level of pain control presently.  No opiate requirement preoperatively.  She did undergo right total hip arthroplasty in 2012, developing respiratory depression with hypoxemia and perhaps mild delirium related to excess opiate analgesia, believes she used oxycodone at that time.  No chest discomfort or dyspnea.  No known history of heart disease.  Stress testing over 10 years ago, which was unremarkable.  Up until 1 month ago, the patient was attending [facility], doing mild aerobic exercise 2-3 days weekly without chest pain or shortness of breath.      No prior issues with anesthesia except for prolonged period to awaken.  No history of abnormal bleeding tendency or blood clots.  Prior transfusion without reaction.  No recent systemic steroids.  Sinusitis two months ago which has since cleared.  No lingering symptomatic compromise.      Essential hypertension.  Did take a.m. amlodipine 5 mg at approximately 4 a.m.  Approximately 2 months ago at time of sinusitis, the patient lost 2 close friends, also developed dizziness.   She was concerned about  the upcoming surgery.  Significant anxiety for which patient was placed on Paxil with clinical improvement.      PAST MEDICAL HISTORY:   1.  Osteoarthritis of knees bilaterally with anticipated right total knee arthroplasty in the future.   2.  History of elevated blood sugar, labeled as prediabetes.  Recent hemoglobin A1c not noted on record.  No Accu-Chek monitoring.   3.  Essential hypertension (as above).   4.  Hyperlipidemia, on red yeast rice, niacin and fish oil.   5.  Osteoporosis.   6.  Environmental allergies, on continued Zyrtec nightly.   7.  Anxiety disorder on Paxil.   8.  Without known coronary disease, reactive airway disease, intrinsic renal disease, peptic ulcer disease, hepatitis, gallbladder disease, thyroid disease, seizure, tuberculosis or anemia.      PAST SURGICAL HISTORY:   1.  Colonoscopy.   2.  Bladder repair.   3.  Cataract extraction bilaterally.   4.  Right total hip replacement in 2012.   5.  Hysterectomy (has ovaries).   6.  Tonsillectomy.      MEDICATION ALLERGIES:  INCLUDE COUGH RELATED TO ACE INHIBITOR.      MEDICATIONS PRIOR TO ADMISSION:   1.  Amlodipine 5 mg daily.   2.  Calcium carbonate 500 mg daily.   3.  Zyrtec 10 mg at bedtime.   4.  Xalatan 0.005% 1 drop in each eye at bedtime.   5.  Amoxicillin prior to dental work.   6.  Vitamin C 1000 mg daily.     7.  Boswellia p.o. more than a month ago.   8.  Vitamin D 1000 units daily.   9.  Ibuprofen p.r.n.   10.  Multivitamin daily.   11.  Niacin 500 mg daily.   12.  Fish oil 1000 mg daily (over 1 month ago).   13.  Red yeast rice daily.   14.  Travoprost 0.004% ophthalmic solution 1 drop in each eye q.p.m., last dose in the past month.      FAMILY HISTORY:  Mother with C. difficile at age 90.  Father with heart disease.  Brother with heart disease, also diabetes.      HABITS:  Smoked for 1 year at age 18.  No alcohol.  No other drug use.      SOCIAL HISTORY:  Lives alone in her own home.  Supportive children.  One son that comes  in from [place] to stay 1-2 months with a patient during the winter.  Two daughters, one I believe a , the other an RN at [employer].      REVIEW OF SYSTEMS:  Denies fever, chills or sweats.  No headache.  Resolution of noted dizziness with sinus infection.  No visual change.  No chest discomfort, dyspnea, cough, palpitations.  No nausea or vomiting, no reflux or abdominal pain.  No diarrhea or constipation.  Last bowel movement on 11/28.  No signs of GI blood loss.  No voiding complaints except for urinary frequency.  No dysuria.  Arthralgia, right knee.  No rash.  Altered sensation of the toes bilaterally subsequent to the above hip procedure.      OBJECTIVE:   GENERAL:  Well-preserved elderly female, sitting semi-upright in bed in no distress, alert and oriented.   VITAL SIGNS:  Blood pressure 114/64, heart rate 60s and regular, respirations nonlabored, O2 sat 96% on 2 liters by nasal cannula, did have respirations earlier down to 7, has since improved to normal.  Temperature normal.   HEENT:  Extraocular movements full.  No nystagmus.  Pupils equal and reacting symmetrically.  Sclerae anicteric.  Fundi deferred.  Oropharynx is clear.  Dentition with adequate repair.  Mucosal membranes are dry.  Carotid upstroke is brisk, no bruits.  There is no thyromegaly or lymphadenopathy.   SKIN:  No rash (exposed areas).   CHEST:  Clear lung fields.   CARDIAC:  Regular without gallop or murmur.  No click, no jugular venous distention.   BREASTS:  Exam deferred.   ABDOMEN:  Nondistended, soft, nontender without palpable organomegaly or mass.  Bowel sounds are present.  No epigastric or iliac bruits.   EXTREMITIES:  Distal lower extremities are well perfused. No cyanosis, clubbing or edema.   MUSCULOSKELETAL:  No synovitis.  There is no posterior calf or thigh tenderness/induration to suggest DVT.   PELVIC/RECTAL:  Deferred.   NEUROLOGIC:  Grossly nonfocal.  Sensory exam not performed.      LABORATORY DATA:   Available laboratory data includes preop urinalysis on 2018 which was bland.  Sodium 144, potassium 3.8, chloride 105, CO2 26.2, BUN 14, creatinine 0.68.  Random glucose 105.  Hemoglobin earlier today 14.5 grams percent with random blood sugar 107.  EKG on 2018 demonstrated normal sinus rhythm with left axis deviation, OR 0.20 consistent with first-degree AV block.  No ischemic change.      ASSESSMENT:  Pleasant 87-year-old female admitted with the followin.  Left total knee arthroplasty.  Indication:  Osteoarthritis.  Tolerable level of pain control.  Respiratory depression related to past excess opiate use at time of right total hip arthroplasty in .  She believes she did use oxycodone at that time.   2.  Osteoarthritis of right knee with anticipated right total knee arthroplasty in the future.   3.  Potential for acute blood loss anemia.   4.  Elevated blood sugar consistent with prediabetes.   5.  Essential hypertension, adequate control.  Transient hypotension intraoperatively.  Would hold amlodipine perioperatively in light of potential blood pressure lowering affect from opiates, blood loss, reduced activity.   6.  Hyperlipidemia, on red yeast rice, niacin and fish oil.   7.  Osteoporosis.   8.  Environmental allergies on bedtime Zyrtec.   9.  Anxiety disorder, fairly well-compensated on low dose Paxil.   10.  Surgeries as above.      PLAN:   1.  Intravenous fluid support adequate p.o. intake.   2.  Parameters for which nursing staff should call.   3.  Serial followup labs.   4.  Incentive spirometry/continuous oximetry.   5.  Review/resume prior to admission medications as appropriate.  Hold amlodipine for now perioperatively.   6.  Review opiate regimen as per Orthopedics.  Would prefer to proceed with low-dose Dilaudid 1 mg q.4 hours p.r.n. in addition to scheduled Extra Strength Tylenol as well as Lidoderm and menthol patches.  Defer Toradol presently with patient's elderly age and  potential nephrotoxicity.   7.  Bowel meds to reduce constipation on opiates.   8.  Coumadin for DVT prophylaxis as per Dr. Jenkins.   9.  Antibiotics as per Dr. Jenkins perioperatively.   10.  Close clinical monitoring.  It is anticipated that patient will eventually go to TCU at discharge as she lives alone and her children are not regularly available due to work schedule.      Thank you for the consultation.         BOB GODINEZ MD             D: 2018   T: 2018   MT: LEROY      Name:     MARVIN SIEGEL   MRN:      [id number]        Account:       [id number]   :      1931           Consult Date:  2018      Document: [id number][WR1.1]         Revision History        User Key Date/Time User Provider Type Action    > WR1.1 2018  6:46 AM Bob Godinez MD Physician Sign     [N/A] 2018  5:42 PM Bob Godinez MD Physician Edit                     Progress Notes - Physician (Notes for yesterday and today)      Progress Notes by Aric Mcfarland MD at 12/3/2018 10:55 AM     Author:  Aric Mcfarland MD Service:  Hospitalist Author Type:  Physician    Filed:  12/3/2018 11:00 AM Date of Service:  12/3/2018 10:55 AM Creation Time:  12/3/2018 10:55 AM    Status:  Signed :  Aric Mcfarland MD (Physician)         Pt notes fair pain control  Frustrated this am re lack of coordination with pain med administration and therapy    No chest pain or SOB    VSS  Afebrile  Alert, pleasant, fully oriented  Lungs clear  CV rrr  Abd soft  L calf soft      BMP nl    Assessment      1. Left total knee arthroplasty. Pain control adequate when meds are administered prior to therapy  2. HTN, stable off amlodipine          Plan  Attempt to better  Co ordinate therapy with pain meds  Continue to hold amlodipine  Continue low dose dilaudid  Pt is medically stable for discharge to TCU     Discussed with daughter[DS1.1]     Revision History        User Key Date/Time User  Provider Type Action    > DS1.1 12/3/2018 11:00 AM Aric Mcfarland MD Physician Sign            Progress Notes by Heriberto Acosta MD at 12/3/2018  7:26 AM     Author:  Heriberto Acosta MD Service:  Orthopedics Author Type:  Resident    Filed:  12/3/2018  7:29 AM Date of Service:  12/3/2018  7:26 AM Creation Time:  12/3/2018  7:26 AM    Status:  Signed :  Heriberto Acosta MD (Resident)         Orthopaedic Surgery Progress Note   December 3, 2018    Subjective: No acute events overnight. Pain well controlled. Tolerating diet. Voiding spontaneously. +Flatus. Denies fever or chills, CP, SOB, numbness or tingling, motor dysfunction or weakness.     Objective: /69 (BP Location: Right arm)  Pulse 86  Temp 98.7  F (37.1  C) (Oral)  Resp 16  Ht 1.524 m (5')  Wt 65 kg (143 lb 4.8 oz)  SpO2 95%  BMI 27.99 kg/m2    General: no acute distress  CV: palpable pulses  Resp: Nonlabored breathing  LLE: long leg soft dressing intact; clean and dry. ACe wrap without strikethrough. Small amount of dry drainage on lateral cast padding.         5/5 TA/GSC/EHL/FHL; SILT DP/SP/Ravin/Saph    Palpable dorsalis pedis pulse    Labs:  Hemoglobin   Date Value Ref Range Status   12/02/2018 12.3 11.7 - 15.7 g/dL Final         Assessment and Plan: Luis Roe is a 87 year old female s/p L primary TKA on 11/30 with Dr. Jenkins. Doing well post-operatively.      Ortho Primary  Activity: Up with assist until independent.  Weight bearing status: WBAT LLE  Pain management: Transition from IV to PO as tolerated.    Antibiotics: Ancef x 24 hours.  Diet: Begin with clear fluids and progress diet as tolerated.   DVT prophylaxis: Aspirin and mechanical while in the hospital, discharge on Aspirin 325mg BID x 4 weeks.  Imaging: PACU.  Labs: Hgb POD#1-2.  Dressings: Keep clean, dry and intact until follow up   Physical Therapy/Occupational Therapy: Eval and treat.  Consults: Hospitalist.  Follow-up: Clinic with Dr. Jenkins in  4 weeks  With left knee x-rays needed.   Disposition: ok for discharge to TCU once placement found    Heriberto Acosta MD  Orthopaedic Surgery Resident, PGY-4  Pager: [phone]     For questions about this patient during the day, please attempt to contact me at my pager ([phone]) prior to contacting the Orthopaedic Surgery resident on call. Thank you![SR1.1]        Revision History        User Key Date/Time User Provider Type Action    > SR1.1 12/3/2018  7:29 AM Heriberto Acosta MD Resident Sign            Progress Notes by Bob Dennis MD at 12/2/2018 11:50 AM     Author:  Bob Dennis MD Service:  Hospitalist Author Type:  Physician    Filed:  12/2/2018 11:56 AM Date of Service:  12/2/2018 11:50 AM Creation Time:  12/2/2018 11:50 AM    Status:  Signed :  Bob Dennis MD (Physician)         [facility] Internal Medicine Progress Note            Interval History:   Record reviewed.  Seen with RN.  Increase pain after PT.  Took 1 mg with repeat 1 hr earlier (wellbefore therapy).  No confusion, sedation or respiratory depression.  Ambulated without LH.  No CP, SOB, minimal cough to clear throat.  No nausea, reflux, abd pain.  BM this AM with supp.  Voiding OK.           Medications:   All medications reviewed today          Physical Exam:   Blood pressure 132/65, pulse 86, temperature 96.9  F (36.1  C), temperature source Oral, resp. rate 18, height 1.524 m (5'), weight 65 kg (143 lb 4.8 oz), SpO2 95 %.    Intake/Output Summary (Last 24 hours) at 12/01/18 1518  Last data filed at 12/01/18 1440   Gross per 24 hour   Intake              860 ml   Output             1590 ml   Net             -730 ml       General:  Alert.  Appropriate.  No distress.  Off O2.     Heent:      Neck:    Skin:    Chest:  clear    Cardiac:  Reg without gallop, murmur.  No JVD.     Abdomen:  Non distended, soft, non-tender.  BS normal.     Extremities:  Perfused.  No edema, calf, posterior thigh  tenderness to suggest DVT.     Neuro:            Data:     Results for orders placed or performed during the hospital encounter of 11/30/18 (from the past 24 hour(s))   Hemoglobin   Result Value Ref Range    Hemoglobin 12.3 11.7 - 15.7 g/dL   Basic metabolic panel   Result Value Ref Range    Sodium 140 133 - 144 mmol/L    Potassium 3.8 3.4 - 5.3 mmol/L    Chloride 107 94 - 109 mmol/L    Carbon Dioxide 26 20 - 32 mmol/L    Anion Gap 7 3 - 14 mmol/L    Glucose 122 (H) 70 - 99 mg/dL    Urea Nitrogen 11 7 - 30 mg/dL    Creatinine 0.62 0.52 - 1.04 mg/dL    GFR Estimate >90 >60 mL/min/1.7m2    GFR Estimate If Black >90 >60 mL/min/1.7m2    Calcium 8.5 8.5 - 10.1 mg/dL                  Assessment and Plan:   1. Left total knee arthroplasty.  Indication:  Osteoarthritis.  Increase  pain after therapy - needs to take  2 mg dilaudid dose timed before session.   Tolerating.  Respiratory depression related to past excess opiate use at time of right total hip arthroplasty in 2012.  LLE doppler neg DVT 11/30.       2.  Osteoarthritis of right knee with anticipated right total knee arthroplasty in the future.   3.  Acute blood loss without anemia.   4.  Elevated blood sugar consistent with prediabetes. Adequate.   5.  Essential hypertension, adequate control. Off amlodipine.    6.  Hyperlipidemia, on red yeast rice, niacin and fish oil. On hold.   7.  Osteoporosis.   8.  Environmental allergies on bedtime Zyrtec.   9.  Anxiety disorder, fairly well-compensated on low dose Paxil.   10.  Constipation.     PLAN:  1)  Dilaudid 2 mg timed pre therapy (reveiwed with RN)   2)  IS, bowel meds  ASA, mobilize.  4)  Trend labs.  5)  Monitor clinically.    Disposition Plan   Expected discharge in 1  days to transitional care unit .     Entered: Bob Dennis 12/02/2018, 11:50 AM              Attestation:  I have reviewed today's vital signs, notes, medications, labs and imaging.     Bob Dennis MD[WR1.1]             Revision  History        User Key Date/Time User Provider Type Action    > WR1.1 2018 11:56 AM Bob Dennis MD Physician Sign            Progress Notes by Ciro Wilkes MD at 2018  7:30 AM     Author:  Ciro Wilkes MD Service:  Orthopedics Author Type:  Fellow    Filed:  2018  8:47 AM Date of Service:  2018  7:30 AM Creation Time:  2018  8:45 AM    Status:  Signed :  Ciro Wilkes MD (Fellow)         Orthopedic Surgery Progress Note    Subjective / Interval Events:   Patient awake in bed; doing well; pain controlled; did not CPM last night but understands the importance of CPM use today. Planning on going to TCU at discharge.    Objective:   Vital Signs Temp (24hrs), Av.3  F (36.8  C), Min:96.9  F (36.1  C), Max:99.3  F (37.4  C)    /65  Pulse 86  Temp 96.9  F (36.1  C) (Oral)  Resp 18  Ht 1.524 m (5')  Wt 65 kg (143 lb 4.8 oz)  SpO2 95%  BMI 27.99 kg/m2    Date 18 0700 - 18 0659   Shift 0021-3431 2317-1203 4350-9295 24 Hour Total   I  N  T  A  K  E   Shift Total  (mL/kg)       O  U  T  P  U  T   Urine 450   450    Shift Total  (mL/kg) 450  (6.92)   450  (6.92)   Weight (kg) 65 65 65 65     Physical Examination   General: no acute distress  CV: palpable pulses  Resp: Nonlabored breathing  LLE: long leg soft dressing in tact; clean and dry    PARI with 35cc and 30cc output last 2 RN shifts       Drain removed this morning    +TA/GSC/EHL/FHL; SILT DP/SP/Ravin/Saph    Palpable dorsalis pedis pulse    Labs (Last 24 hour):   Lab Results   Component Value Date    WBC 7.9 2012    HGB 12.3 2018    HCT 33.6 2012    MCV 96 2012     2012    RDW 14.4 2012     Lab Results   Component Value Date    BUN 11 2018     2018    CO2 26 2018    ANIONGAP 7 2018     All cultures:  No results for input(s): CULT in the last 168 hours.  Assessment / Plan:   This is a 87 year old female s/p left TKA  on 11/30/2018 by Dr. Jenkins    Pain control; continue to wean medications as tolerated  CV: Monitor HR/BP  Resp: Encourage IS  Heme/ID: Monitor Hgb 12.3 this morning  GI/: diet as tolerated  MSK: WBAT, PT/OT  Post-op antibiotics completed  PARI drain removed this morning  DVT ppx: aspirin  Encourage CPM use throughout the day/night    Plan for discharge to TCU tomorrow    Ciro Wilkes MD  Orthopaedic Surgery, Adult Reconstruction Fellow  Pager [phone][RL1.1]     Revision History        User Key Date/Time User Provider Type Action    > RL1.1 12/2/2018  8:47 AM Ciro Wilkes MD Fellow Sign                  Procedure Notes     No notes of this type exist for this encounter.         Progress Notes - Therapies (Notes from 11/30/18 through 12/03/18)      Progress Notes by Lizeth Goss OT at 12/1/2018 10:50 AM     Author:  Lizeth Goss OT Service:  (none) Author Type:  Occupational Therapist    Filed:  12/1/2018 10:50 AM Date of Service:  12/1/2018 10:50 AM Creation Time:  12/1/2018 10:50 AM    Status:  Signed :  Lizeth Goss OT (Occupational Therapist)          12/01/18 0955   Quick Adds   Type of Visit Initial Occupational Therapy Evaluation   Living Environment   Lives With alone   Living Arrangements house   Home Accessibility grab bars present (bathtub);tub/shower is not walk in;stairs to enter home;stairs within home   Number of Stairs to Enter Home 1   Number of Stairs Within Home 12  (6 up and 6 down)   Stair Railings at Home inside, present on left side   Transportation Available family or friend will provide   Living Environment Comment Split level home.  Bedroom, bathroom, kitchen on upper level.  Laundry on lower.  Pt has tub/shower with grab bars, shower chair.  Pt has vanity by toilet.    Self-Care   Dominant Hand left   Usual Activity Tolerance moderate   Current Activity Tolerance fair   Regular Exercise other (see comments)   Equipment Currently Used at Home none    Activity/Exercise/Self-Care Comment Pt has shower chair, FWW, 4WW, cane.  Thinks she has some ADL equipment but not sure which.   Functional Level Prior   Ambulation 0-->independent   Transferring 0-->independent   Toileting 0-->independent   Bathing 0-->independent   Dressing 0-->independent   Eating 0-->independent   Communication 0-->understands/communicates without difficulty   Swallowing 0-->swallows foods/liquids without difficulty   Cognition 0 - no cognition issues reported   Fall history within last six months yes   Number of times patient has fallen within last six months 1   Which of the above functional risks had a recent onset or change? ambulation;transferring;toileting;bathing;dressing   Prior Functional Level Comment Pt previously IND with ADL/IADL.  Did not use AD   General Information   Onset of Illness/Injury or Date of Surgery - Date 11/30/18   Referring Physician Beto Jenkins MD   Patient/Family Goals Statement to get back to normal   Additional Occupational Profile Info/Pertinent History of Current Problem s/p L TKA   Precautions/Limitations fall precautions   Weight-Bearing Status - LUE full weight-bearing   Weight-Bearing Status - RUE full weight-bearing   Weight-Bearing Status - LLE weight-bearing as tolerated   Weight-Bearing Status - RLE full weight-bearing   Cognitive Status Examination   Orientation orientation to person, place and time   Level of Consciousness alert   Able to Follow Commands WNL/WFL   Personal Safety (Cognitive) WNL/WFL   Cognitive Comment No concerns reported or observed.  Pt is anxious about pain and recoils from light touch in anticipation of pain.   Visual Perception   Visual Perception Comments Denies concerns   Sensory Examination   Sensory Comments denies concerns   Pain Assessment   Patient Currently in Pain Yes, see Vital Sign flowsheet   Integumentary/Edema   Integumentary/Edema Comments baseline dependent edema in L ankle that reduces with elevation    Range of Motion (ROM)   ROM Comment WFL BUE   Strength   Strength Comments Not tested, appears WFL during session   Mobility   Bed Mobility Comments CGA   Transfer Skills   Transfer Comments min A   Lower Body Dressing   Level of [place]: Dress Lower Body maximum assist (25% patients effort)   Grooming   Level of [place]: Grooming stand-by assist   Instrumental Activities of Daily Living (IADL)   Previous Responsibilities meal prep;housekeeping;laundry;shopping;medication management;finances;driving   IADL Comments Pt's son/grandson usually complete outdoor work, otherwise pt is independent   Activities of Daily Living Analysis   Impairments Contributing to Impaired Activities of Daily Living balance impaired;fear and anxiety;pain;ROM decreased   General Therapy Interventions   Planned Therapy Interventions IADL retraining;ADL retraining;bed mobility training;strengthening;transfer training   Clinical Impression   Criteria for Skilled Therapeutic Interventions Met yes, treatment indicated   OT Diagnosis decreased independence with ADL   Influenced by the following impairments pain, decreased knee ROM impacting reach to feet, anxiety, decreased activity tolerance   Assessment of Occupational Performance 3-5 Performance Deficits   Identified Performance Deficits transfers, dressing, bathing, toileting   Clinical Decision Making (Complexity) Moderate complexity   Therapy Frequency daily   Predicted Duration of Therapy Intervention (days/wks) 4 days   Anticipated Equipment Needs at Discharge bathing equipment;dressing equipment   Anticipated Discharge Disposition Transitional Care Facility   Risks and Benefits of Treatment have been explained. Yes   Patient, Family & other staff in agreement with plan of care Yes   Clinical Impression Comments Pt presents with decreased independence with ADL 2/2 pain, decreased reach to feet, limited activity tolerance, and anxiety.  Pt would benefit from skilled OT to  "address and maximize safety/IND with ADL   [facility] TM \"6 Clicks\"   2016, Trustees of [facility], under license to [employer]  All rights reserved.   6 Clicks Short Forms Daily Activity Inpatient Short Form   [facility]  \"6 Clicks\" Daily Activity Inpatient Short Form   1. Putting on and taking off regular lower body clothing? 2 - A Lot   2. Bathing (including washing, rinsing, drying)? 2 - A Lot   3. Toileting, which includes using toilet, bedpan or urinal? 2 - A Lot   4. Putting on and taking off regular upper body clothing? 4 - None   5. Taking care of personal grooming such as brushing teeth? 4 - None   6. Eating meals? 4 - None   Daily Activity Raw Score (Score out of 24.Lower scores equate to lower levels of function) 18   Total Evaluation Time   Total Evaluation Time (Minutes) 15[KH1.1]        Revision History        User Key Date/Time User Provider Type Action    > KH1.1 12/1/2018 10:50 AM Lizeth Goss OT Occupational Therapist Sign            Progress Notes by Kelley Whittaker Pt, PT at 11/30/2018  5:10 PM     Author:  Kelley Whittaker Pt, PT Service:  (none) Author Type:  Physical Therapist    Filed:  11/30/2018  5:10 PM Date of Service:  11/30/2018  5:10 PM Creation Time:  11/30/2018  5:10 PM    Status:  Signed :  Kelley Whittaker Pt, PT (Physical Therapist)          11/30/18 1617   Quick Adds   Type of Visit Initial PT Evaluation       Present no   Language English   Living Environment   Lives With alone  (grandson been staying with her)   Living Arrangements house   Home Accessibility grab bars present (bathtub);tub/shower is not walk in   Number of Stairs to Enter Home 1   Number of Stairs Within Home 6  (up to bedroom and 6 downstairs to basement)   Stair Railings at Home inside, present on left side   Transportation Available family or friend will provide;car   Living Environment Comment Pt reported having 1 step into the home " and then 6 to go up to her bedroom or down to the basement.    Self-Care   Dominant Hand left   Usual Activity Tolerance moderate   Current Activity Tolerance fair   Regular Exercise other (see comments)  (Going to silver sneakers 2 months ago )   Equipment Currently Used at Home none   Activity/Exercise/Self-Care Comment Pt reported being independent with all ADLs at baseline.    Functional Level Prior   Ambulation 0-->independent   Transferring 0-->independent   Toileting 0-->independent   Bathing 0-->independent   Dressing 0-->independent   Eating 0-->independent   Communication 0-->understands/communicates without difficulty   Swallowing 0-->swallows foods/liquids without difficulty   Cognition 0 - no cognition issues reported   Fall history within last six months yes   Number of times patient has fallen within last six months 1   Which of the above functional risks had a recent onset or change? ambulation;transferring   Prior Functional Level Comment Pt reported not using an AD prior to surgery.    General Information   Onset of Illness/Injury or Date of Surgery - Date 11/29/18   Referring Physician Beto Jenkins MD   Patient/Family Goals Statement Pt would like to get back to the [employer] and go to [employer] on Sundays.    Pertinent History of Current Problem (include personal factors and/or comorbidities that impact the POC) Pt is s/p left TKA, see chart for PMH.    Precautions/Limitations fall precautions  (No pillow under left knee)   Weight-Bearing Status - LUE full weight-bearing   Weight-Bearing Status - RUE full weight-bearing   Weight-Bearing Status - LLE weight-bearing as tolerated   Weight-Bearing Status - RLE full weight-bearing   Heart Disease Risk Factors High blood pressure   General Observations Pt supine in bed at start of PT evaluation.  Pt has hemovac in place, getting IV fluids, oxygen, capnography, BP cuff, and PCDs.    General Info Comments Pt daughters present for part of PT  evaluation/treatment.    Cognitive Status Examination   Orientation other (see comments)  (Not tested)   Level of Consciousness alert   Follows Commands and Answers Questions 100% of the time;able to follow multistep instructions   Personal Safety and Judgment intact   Memory intact   Pain Assessment   Patient Currently in Pain Yes, see Vital Sign flowsheet   Integumentary/Edema   Integumentary/Edema other (describe)   Integumentary/Edema Comments Incision not observerd due to post-op dressing in place.    Posture    Posture Forward head position;Protracted shoulders   Range of Motion (ROM)   ROM Comment Left knee ROM 0-10-90 degrees AROM.  Pt demonstrated decreased right LE ROM with bed mobiity, transfers, and gait.     Strength   Strength Comments LE strength not formally tested.  Pt needed min A with lifting left LE in/out of bed and needed assistance SLR.  Pt demonstrated decreased right LE weakness when getting into bed, needed to assist it into bed using UEs.     Bed Mobility   Bed Mobility Comments Supine to/form sitting with HOB elevated and min A x 1.  Supine scooting with SBA x 1.    Transfer Skills   Transfer Comments Sit to standing from EOB with FWW and min A x 1.  Standing to sitting at EOB with FWW and CGA x 1.     Gait   Gait Comments Pt was able to take 3 side steps towards HOB with FWW and CGA x 1.     Balance   Balance Comments Pt demonstrated good sitting balance at EOB and fair standing balance with FWW.     Sensory Examination   Sensory Perception no deficits were identified   General Therapy Interventions   Planned Therapy Interventions bed mobility training;gait training;ROM;strengthening;transfer training;home program guidelines   Intervention Comments Bed mobility, transfers, gait, and LE strengthening/ROM initiated.    Clinical Impression   Criteria for Skilled Therapeutic Intervention yes, treatment indicated   PT Diagnosis Decreased strength, decreased ROM, and impaired functional  "moblity.     Influenced by the following impairments Post-op pain, weakness, decreased ROM, and s/p left TKA.    Functional limitations due to impairments Impaired bed mobility, transfers, and gait.    Clinical Presentation Stable/Uncomplicated   Clinical Presentation Rationale Pt is a 88 y/o female s/p left TKA.  Pt was independent with all functional mobility and ADLs at baseline.  Pt does not have any significant PMH that will impact PT POC.  Pt reported that knee pain prior to surgery was limiting mobility.    Clinical Decision Making (Complexity) Low complexity   Therapy Frequency` 2 times/day   Predicted Duration of Therapy Intervention (days/wks) 4 days   Anticipated Equipment Needs at Discharge other (see comments)  (Has SEC and FWW)   Anticipated Discharge Disposition Transitional Care Facility   Risk & Benefits of therapy have been explained Yes   Patient, Family & other staff in agreement with plan of care Yes   [facility] TM \"6 Clicks\"   2016, Trustees of [facility], under license to [employer].  All rights reserved.   6 Clicks Short Forms Basic Mobility Inpatient Short Form   [facility] AM-PAC  \"6 Clicks\" V.2 Basic Mobility Inpatient Short Form   1. Turning from your back to your side while in a flat bed without using bedrails? 4 - None   2. Moving from lying on your back to sitting on the side of a flat bed without using bedrails? 3 - A Little   3. Moving to and from a bed to a chair (including a wheelchair)? 3 - A Little   4. Standing up from a chair using your arms (e.g., wheelchair, or bedside chair)? 3 - A Little   5. To walk in hospital room? 3 - A Little   6. Climbing 3-5 steps with a railing? 2 - A Lot   Basic Mobility Raw Score (Score out of 24.Lower scores equate to lower levels of function) 18   Total Evaluation Time   Total Evaluation Time (Minutes) 13[RH1.1]        Revision History        User Key Date/Time User Provider Type Action    > RH1.1 11/30/2018  5:10 " PM Kelley Whittaker Pt, PT Physical Therapist Sign                                                      INTERAGENCY TRANSFER FORM - LAB / IMAGING / EKG / EMG RESULTS   11/30/2018                       UR 8A: [phone]            Unresulted Labs (24h ago through future)    Start       Ordered    Unscheduled  Hemoglobin  CONDITIONAL X 2,   Routine     Comments:  Release on POD 1 and POD 2 if the morning Hgb is less than 8.0    11/30/18 1418         Lab Results - 3 Days      Basic metabolic panel [[id number]] (Abnormal)  Resulted: 12/02/18 0550, Result status: Final result    Ordering provider: Bob Dennis MD  12/02/18 0000 Resulting lab: [facility]    Specimen Information    Type Source Collected On   Blood  12/02/18 0529          Components       Value Reference Range Flag Lab   Sodium 140 133 - 144 mmol/L  13   Potassium 3.8 3.4 - 5.3 mmol/L  13   Chloride 107 94 - 109 mmol/L  13   Carbon Dioxide 26 20 - 32 mmol/L  13   Anion Gap 7 3 - 14 mmol/L  13   Glucose 122 70 - 99 mg/dL H 13   Urea Nitrogen 11 7 - 30 mg/dL  13   Creatinine 0.62 0.52 - 1.04 mg/dL  13   GFR Estimate >90 >60 mL/min/1.7m2  13   Comment:  Non  GFR Calc   GFR Estimate If Black >90 >60 mL/min/1.7m2  13   Comment:  African American GFR Calc   Calcium 8.5 8.5 - 10.1 mg/dL  13            Hemoglobin [[id number]]  Resulted: 12/02/18 0536, Result status: Final result    Ordering provider: Heriberto Acosta MD  12/02/18 0000 Resulting lab: [facility]    Specimen Information    Type Source Collected On   Blood  12/02/18 0529          Components       Value Reference Range Flag Lab   Hemoglobin 12.3 11.7 - 15.7 g/dL  13            Basic metabolic panel [[id number]] (Abnormal)  Resulted: 12/01/18 0640, Result status: Final result    Ordering provider: Heriberto Acosta MD  12/01/18 0000 Resulting lab: [facility]    Specimen Information     Type Source Collected On   Blood  12/01/18 0608          Components       Value Reference Range Flag Lab   Sodium 140 133 - 144 mmol/L  13   Potassium 3.9 3.4 - 5.3 mmol/L  13   Chloride 108 94 - 109 mmol/L  13   Carbon Dioxide 28 20 - 32 mmol/L  13   Anion Gap 4 3 - 14 mmol/L  13   Glucose 126 70 - 99 mg/dL H 13   Urea Nitrogen 12 7 - 30 mg/dL  13   Creatinine 0.57 0.52 - 1.04 mg/dL  13   GFR Estimate >90 >60 mL/min/1.7m2  13   Comment:  Non  GFR Calc   GFR Estimate If Black >90 >60 mL/min/1.7m2  13   Comment:  African American GFR Calc   Calcium 8.3 8.5 - 10.1 mg/dL L 13            Magnesium [[id number]]  Resulted: 12/01/18 0640, Result status: Final result    Ordering provider: Bob Dennis MD  12/01/18 0000 Resulting lab: [facility]    Specimen Information    Type Source Collected On   Blood  12/01/18 0608          Components       Value Reference Range Flag Lab   Magnesium 1.9 1.6 - 2.3 mg/dL  13            Hemoglobin [[id number]]  Resulted: 12/01/18 0624, Result status: Final result    Ordering provider: Heriberto Acosta MD  12/01/18 0000 Resulting lab: [facility]    Specimen Information    Type Source Collected On   Blood  12/01/18 0608          Components       Value Reference Range Flag Lab   Hemoglobin 12.1 11.7 - 15.7 g/dL  13            Glucose [[id number]] (Abnormal)  Resulted: 11/30/18 0730, Result status: Final result    Ordering provider: Jade Shea MD  11/30/18 0637 Resulting lab: [facility]    Specimen Information    Type Source Collected On   Blood  11/30/18 0701          Components       Value Reference Range Flag Lab   Glucose 107 70 - 99 mg/dL H 13            Hemoglobin [[id number]]  Resulted: 11/30/18 0714, Result status: Final result    Ordering provider: Jade Shea MD  11/30/18 0637 Resulting lab: [facility]    Specimen  Information    Type Source Collected On   Blood  11/30/18 0701          Components       Value Reference Range Flag Lab   Hemoglobin 14.5 11.7 - 15.7 g/dL  13            Testing Performed By     Lab - Abbreviation Name Director Address Valid Date Range    13 - Unknown [facility] Unknown [address]  [facility] [address] 01/15/15 0916 - Present               Imaging Results - 3 Days      US Lower Extremity Venous Duplex Bilateral [[id number]]  Resulted: 12/01/18 0700, Result status: Final result    Ordering provider: Edmund Kent MD  11/30/18 2035 Resulted by: Dexter Mccartney MD Chen, Ting, MD    Performed: 11/30/18 2158 - 11/30/18 2232 Resulting lab: RADIOLOGY RESULTS    Narrative:       EXAMINATION: US LOWER EXTREMITY VENOUS DUPLEX BILATERAL, 11/30/2018  10:32 PM     COMPARISON: None.    HISTORY: evaluate for dv/t in post-op ortho patietn;     TECHNIQUE:  Gray-scale evaluation with compression, spectral flow and  color Doppler assessment of the deep venous system of both legs from  groin to knee, and then at the ankles.    FINDINGS:  In the right lower extremity, the common femoral, femoral, popliteal  and posterior tibial veins demonstrate normal compressibility and  blood flow.    In the left lower extremity, the common femoral, proximal and mid  femoral vein, posterior tibial vein, and peroneal veins demonstrate  normal compressibility and blood flow. Distal femoral vein and  popliteal vein are obscured by bandages.      Impression:       IMPRESSION:  No evidence of deep venous thrombosis in visualized lower venous  systems.    I have personally reviewed the examination and initial interpretation  and I agree with the findings.    DEXTER MCCARTNEY MD      XR Knee Port Left 1/2 Views [[id number]]  Resulted: 11/30/18 1417, Result status: Final result    Ordering provider: Heriberto Acosta MD  11/30/18 1047 Resulted by: Rene Freeman MD Gross, [place], MD     Performed: 11/30/18 1059 - 11/30/18 1117 Resulting lab: RADIOLOGY RESULTS    Narrative:       2 views right knee radiographs 11/30/2018 11:48 AM    History: post op L TKA;       Comparison: Radiograph left femur and 11/30/2018    Findings:    AP and lateral right knee were obtained.    Postsurgical changes of total right knee arthroplasty, with surgical  drain in place and subcutaneous gas.     Linear lucency in the lateral proximal tibia, which is believed to be  representing superimposition of the soft tissue defect seen on the  lateral view.     Soft tissue mineralization around the knee.      Impression:       Impression: New postsurgical changes of left total knee arthroplasty.    I have personally reviewed the examination and initial interpretation  and I agree with the findings.    FINN EL      XR Knee Port Left 1/2 Views [[id number]]  Resulted: 11/30/18 1119, Result status: Final result    Ordering provider: Beto Jenkins MD  11/30/18 0852 Resulted by: Finn El MD Benson, Michael Quinn, DO    Performed: 11/30/18 0853 - 11/30/18 0911 Resulting lab: RADIOLOGY RESULTS    Narrative:       1 view left knee radiographs 11/30/2018 10:56 AM    History: Left knee total arthroplasty;     Comparison: 9/17/2018    Findings:    Intraoperative AP view of the left knee was obtained.     Large soft tissue defect around the knee in this intraoperative film  with surgical changes at the knee joint interface with some  mineralization around the knee.      Impression:       Impression:  Large soft tissue defect around the left knee with surgical changes at  the knee joint interface.    I have personally reviewed the examination and initial interpretation  and I agree with the findings.    FINN EL      XR Knee Port Left 1/2 Views [[id number]]  Resulted: 11/30/18 1119, Result status: Final result    Ordering provider: Beto Jenkins MD  11/30/18 0904 Resulted by: Finn El MD Benson,  Levi Kaur DO    Performed: 11/30/18 0904 - 11/30/18 0913 Resulting lab: RADIOLOGY RESULTS    Narrative:       1 views left knee radiographs 11/30/2018 11:00 AM    History: Intra op left knee  TKA;      Comparison: Same day radiograph, 9/17/2018    Findings:    Intraoperative AP of the left knee were obtained.     Large soft tissue defect around the knee in this intraoperative film  with surgical changes at the knee joint interface with some  mineralization around the knee.      Impression:       Impression:  Large soft tissue defect around the left knee with surgical changes at  the knee joint interface.    I have personally reviewed the examination and initial interpretation  and I agree with the findings.    FINN EL      POC US GUIDANCE NEEDLE PLACEMENT [[id number]]  Resulted: 11/30/18 0556, Result status: In process    Ordering provider: Antoni Dominguez MD  11/30/18 0556 Performed: 11/30/18 0556 - 11/30/18 0556    Resulting lab: RAD[facility]             Testing Performed By     Lab - Abbreviation Name Director Address Valid Date Range    104 - Rad Rslts RADIOLOGY RESULTS Unknown Unknown 02/16/05 1553 - Present    1735 - [facility] Unknown Unknown 05/12/15 0832 - Present                     Luis Roe #[id number] (CSN:N/A)  (87 year old F)              Encounter-Level Documents:     There are no encounter-level documents.      Order-Level Documents - 11/30/2018:     Scan on 11/29/2018 12:03 PM by Outside, Provider : EKG, [facility] (below)

## 2018-12-01 ENCOUNTER — APPOINTMENT (OUTPATIENT)
Dept: OCCUPATIONAL THERAPY | Facility: CLINIC | Age: 83
DRG: 470 | End: 2018-12-01
Attending: ORTHOPAEDIC SURGERY
Payer: MEDICARE

## 2018-12-01 ENCOUNTER — APPOINTMENT (OUTPATIENT)
Dept: PHYSICAL THERAPY | Facility: CLINIC | Age: 83
DRG: 470 | End: 2018-12-01
Attending: ORTHOPAEDIC SURGERY
Payer: MEDICARE

## 2018-12-01 LAB
ANION GAP SERPL CALCULATED.3IONS-SCNC: 4 MMOL/L (ref 3–14)
BUN SERPL-MCNC: 12 MG/DL (ref 7–30)
CALCIUM SERPL-MCNC: 8.3 MG/DL (ref 8.5–10.1)
CHLORIDE SERPL-SCNC: 108 MMOL/L (ref 94–109)
CO2 SERPL-SCNC: 28 MMOL/L (ref 20–32)
CREAT SERPL-MCNC: 0.57 MG/DL (ref 0.52–1.04)
GFR SERPL CREATININE-BSD FRML MDRD: >90 ML/MIN/1.7M2
GLUCOSE SERPL-MCNC: 126 MG/DL (ref 70–99)
HGB BLD-MCNC: 12.1 G/DL (ref 11.7–15.7)
MAGNESIUM SERPL-MCNC: 1.9 MG/DL (ref 1.6–2.3)
POTASSIUM SERPL-SCNC: 3.9 MMOL/L (ref 3.4–5.3)
SODIUM SERPL-SCNC: 140 MMOL/L (ref 133–144)

## 2018-12-01 PROCEDURE — 97110 THERAPEUTIC EXERCISES: CPT | Mod: GP

## 2018-12-01 PROCEDURE — 25000132 ZZH RX MED GY IP 250 OP 250 PS 637: Mod: GY | Performed by: STUDENT IN AN ORGANIZED HEALTH CARE EDUCATION/TRAINING PROGRAM

## 2018-12-01 PROCEDURE — 12000001 ZZH R&B MED SURG/OB UMMC

## 2018-12-01 PROCEDURE — A9270 NON-COVERED ITEM OR SERVICE: HCPCS | Mod: GY | Performed by: STUDENT IN AN ORGANIZED HEALTH CARE EDUCATION/TRAINING PROGRAM

## 2018-12-01 PROCEDURE — 80048 BASIC METABOLIC PNL TOTAL CA: CPT | Performed by: ORTHOPAEDIC SURGERY

## 2018-12-01 PROCEDURE — 25000132 ZZH RX MED GY IP 250 OP 250 PS 637: Mod: GY | Performed by: INTERNAL MEDICINE

## 2018-12-01 PROCEDURE — 83735 ASSAY OF MAGNESIUM: CPT | Performed by: ORTHOPAEDIC SURGERY

## 2018-12-01 PROCEDURE — 97535 SELF CARE MNGMENT TRAINING: CPT | Mod: GO

## 2018-12-01 PROCEDURE — 25000128 H RX IP 250 OP 636: Performed by: STUDENT IN AN ORGANIZED HEALTH CARE EDUCATION/TRAINING PROGRAM

## 2018-12-01 PROCEDURE — 40000133 ZZH STATISTIC OT WARD VISIT

## 2018-12-01 PROCEDURE — 99232 SBSQ HOSP IP/OBS MODERATE 35: CPT | Performed by: INTERNAL MEDICINE

## 2018-12-01 PROCEDURE — 40000193 ZZH STATISTIC PT WARD VISIT

## 2018-12-01 PROCEDURE — 97530 THERAPEUTIC ACTIVITIES: CPT | Mod: GO

## 2018-12-01 PROCEDURE — 85018 HEMOGLOBIN: CPT | Performed by: ORTHOPAEDIC SURGERY

## 2018-12-01 PROCEDURE — A9270 NON-COVERED ITEM OR SERVICE: HCPCS | Mod: GY | Performed by: INTERNAL MEDICINE

## 2018-12-01 PROCEDURE — 97530 THERAPEUTIC ACTIVITIES: CPT | Mod: GP

## 2018-12-01 PROCEDURE — 97166 OT EVAL MOD COMPLEX 45 MIN: CPT | Mod: GO

## 2018-12-01 PROCEDURE — 36415 COLL VENOUS BLD VENIPUNCTURE: CPT | Performed by: ORTHOPAEDIC SURGERY

## 2018-12-01 RX ADMIN — VITAMIN D, TAB 1000IU (100/BT) 1000 UNITS: 25 TAB at 08:10

## 2018-12-01 RX ADMIN — CEFAZOLIN SODIUM 1 G: 1 INJECTION, POWDER, FOR SOLUTION INTRAMUSCULAR; INTRAVENOUS at 02:36

## 2018-12-01 RX ADMIN — Medication 1 MG: at 10:16

## 2018-12-01 RX ADMIN — Medication 1 MG: at 14:35

## 2018-12-01 RX ADMIN — ACETAMINOPHEN 975 MG: 325 TABLET, FILM COATED ORAL at 11:55

## 2018-12-01 RX ADMIN — ASPIRIN 325 MG: 325 TABLET, DELAYED RELEASE ORAL at 08:10

## 2018-12-01 RX ADMIN — LATANOPROST 1 DROP: 50 SOLUTION OPHTHALMIC at 21:27

## 2018-12-01 RX ADMIN — Medication 1 MG: at 02:36

## 2018-12-01 RX ADMIN — SENNOSIDES AND DOCUSATE SODIUM 2 TABLET: 8.6; 5 TABLET ORAL at 08:11

## 2018-12-01 RX ADMIN — Medication 2 MG: at 22:26

## 2018-12-01 RX ADMIN — Medication 1 MG: at 18:33

## 2018-12-01 RX ADMIN — Medication 1 MG: at 19:05

## 2018-12-01 RX ADMIN — CETIRIZINE HYDROCHLORIDE 10 MG: 10 TABLET, FILM COATED ORAL at 21:26

## 2018-12-01 RX ADMIN — SENNOSIDES AND DOCUSATE SODIUM 2 TABLET: 8.6; 5 TABLET ORAL at 19:06

## 2018-12-01 RX ADMIN — ACETAMINOPHEN 975 MG: 325 TABLET, FILM COATED ORAL at 02:36

## 2018-12-01 RX ADMIN — ACETAMINOPHEN 975 MG: 325 TABLET, FILM COATED ORAL at 18:33

## 2018-12-01 RX ADMIN — ASPIRIN 325 MG: 325 TABLET, DELAYED RELEASE ORAL at 19:05

## 2018-12-01 RX ADMIN — MULTIPLE VITAMINS W/ MINERALS TAB 1 TABLET: TAB at 08:10

## 2018-12-01 RX ADMIN — Medication 1 MG: at 08:49

## 2018-12-01 RX ADMIN — CALCIUM 500 MG: 500 TABLET ORAL at 08:10

## 2018-12-01 ASSESSMENT — ACTIVITIES OF DAILY LIVING (ADL)
ADLS_ACUITY_SCORE: 12
PREVIOUS_RESPONSIBILITIES: MEAL PREP;HOUSEKEEPING;LAUNDRY;SHOPPING;MEDICATION MANAGEMENT;FINANCES;DRIVING
ADLS_ACUITY_SCORE: 12

## 2018-12-01 NOTE — PROGRESS NOTES
[facility] Internal Medicine Progress Note            Interval History:   Record reviewed.  Seen with RN.  Increase in po dilaudid to 1-2 mg dose for inadequate pain control.  Tolerated 2 mg.  Good result.  No sedation, confusion.  Ambulated and sat in chair without LH.  No CP, SOB, cough, nausea, reflux, abd pain or distention.  Passing flatus.  Last BM 3 days ago.  Voiding after natarajan discontinued.           Medications:   All medications reviewed today          Physical Exam:   Blood pressure 128/65, pulse 86, temperature 98.3  F (36.8  C), resp. rate 16, height 1.524 m (5'), weight 65 kg (143 lb 4.8 oz), SpO2 93 %.    Intake/Output Summary (Last 24 hours) at 12/01/18 1518  Last data filed at 12/01/18 1440   Gross per 24 hour   Intake              860 ml   Output             1590 ml   Net             -730 ml       General:  Alert.  Appropriate.  No distress.  Off O2.     Heent:      Neck:    Skin:    Chest:  clear    Cardiac:  Reg without gallop, murmur.  No JVD.     Abdomen:  Non distended, soft, non-tender.  BS normal.     Extremities:  Perfused.  No edema, calf, posterior thigh tenderness to suggest DVT.     Neuro:            Data:     Results for orders placed or performed during the hospital encounter of 11/30/18 (from the past 24 hour(s))   US Lower Extremity Venous Duplex Bilateral    Narrative    EXAMINATION: US LOWER EXTREMITY VENOUS DUPLEX BILATERAL, 11/30/2018  10:32 PM     COMPARISON: None.    HISTORY: evaluate for dv/t in post-op ortho patietn;     TECHNIQUE:  Gray-scale evaluation with compression, spectral flow and  color Doppler assessment of the deep venous system of both legs from  groin to knee, and then at the ankles.    FINDINGS:  In the right lower extremity, the common femoral, femoral, popliteal  and posterior tibial veins demonstrate normal compressibility and  blood flow.    In the left lower extremity, the common femoral, proximal and mid  femoral vein, posterior tibial vein, and  peroneal veins demonstrate  normal compressibility and blood flow. Distal femoral vein and  popliteal vein are obscured by bandages.      Impression    IMPRESSION:  No evidence of deep venous thrombosis in visualized lower venous  systems.    I have personally reviewed the examination and initial interpretation  and I agree with the findings.    SHARONDA MCCARTNEY MD   Hemoglobin   Result Value Ref Range    Hemoglobin 12.1 11.7 - 15.7 g/dL   Basic metabolic panel   Result Value Ref Range    Sodium 140 133 - 144 mmol/L    Potassium 3.9 3.4 - 5.3 mmol/L    Chloride 108 94 - 109 mmol/L    Carbon Dioxide 28 20 - 32 mmol/L    Anion Gap 4 3 - 14 mmol/L    Glucose 126 (H) 70 - 99 mg/dL    Urea Nitrogen 12 7 - 30 mg/dL    Creatinine 0.57 0.52 - 1.04 mg/dL    GFR Estimate >90 >60 mL/min/1.7m2    GFR Estimate If Black >90 >60 mL/min/1.7m2    Calcium 8.3 (L) 8.5 - 10.1 mg/dL   Magnesium   Result Value Ref Range    Magnesium 1.9 1.6 - 2.3 mg/dL                  Assessment and Plan:   1. Left total knee arthroplasty.  Indication:  Osteoarthritis.  Improved adequate pain control with 2 mg dilaudid dose. Tolerating.  Respiratory depression related to past excess opiate use at time of right total hip arthroplasty in 2012.  LLE doppler neg DVT.   2.  Osteoarthritis of right knee with anticipated right total knee arthroplasty in the future.   3.  Acute blood loss without anemia.   4.  Elevated blood sugar consistent with prediabetes. Adequate.   5.  Essential hypertension, adequate control. Off amlodipine.    6.  Hyperlipidemia, on red yeast rice, niacin and fish oil. On hold.   7.  Osteoporosis.   8.  Environmental allergies on bedtime Zyrtec.   9.  Anxiety disorder, fairly well-compensated on low dose Paxil.   10.  Constipation.     PLAN:  1)  SL IV.   2)  Increase po dilaudid 1-2 mg every 4 hrs prn.  3)  IS, bowel meds (add prune juice), supp 12/2 if no results, ASA, mobilize.  4)  Trend labs.  5)  Monitor clinically.    Disposition  Plan   Expected discharge in 2  days to transitional care unit .     Entered: Bob Dennis 12/01/2018, 3:18 PM              Attestation:  I have reviewed today's vital signs, notes, medications, labs and imaging.     Bob Dennis MD

## 2018-12-01 NOTE — PLAN OF CARE
Problem: Patient Care Overview  Goal: Plan of Care/Patient Progress Review  Outcome: Improving  Patient A/Ox4. VSS. Denies CP, SOB, dizziness/LH. LSCTA. +fl/BS. Voiding per BSC with assist of 1 using walker.  Pain is well managed with Dilaudid 1-2 mg po .CMS intact. Dressing to left knee  CDI, ice applied. Tolerating regular diet . IS encouraged. Activity as tolerated. IV SL. Bed alarm on. Patient has demonstrated ability to call appropriately. Patient is resting with call light within reach. Will continue to monitor.

## 2018-12-01 NOTE — PLAN OF CARE
Problem: Patient Care Overview  Goal: Plan of Care/Patient Progress Review  Discharge Planner OT   Patient plan for discharge: TCU  Current status: OT evaluation completed.  Pt continues to be limited by pain, received second dose of pain meds during session.  Pt completes g/h from EOB with set-up.  Pt attempts to transfer to chair but returns to sitting 2/2 pain.    Barriers to return to prior living situation: medical status, pain, limited activity tolerance, level of A for ADL  Recommendations for discharge: TCU  Rationale for recommendations: pt would benefit from continued OT to maximize safety and IND with ADL       Entered by: Lizeth Goss 12/01/2018 10:54 AM

## 2018-12-01 NOTE — CONSULTS
Social Work: Assessment with Discharge Plan    Patient Name:  Luis Roe  :  1931  Age:  87 year old  MRN:  [id number]  Risk/Complexity Score:     Completed assessment with:  Patient, EMR    Presenting Information   Reason for Referral:  Discharge plan  Date of Intake:  2018  Referral Source:  Physician  Decision Maker:  Patient  Alternate Decision Maker:  NOK (adult children)  Health Care Directive:  Provided education  Living Situation:  House  Previous Functional Status:  Independent  Patient and family understanding of hospitalization:  Left TK Replacement  Cultural/Language/Spiritual Considerations:  No language cultural considerations noted/ Confucianist nadiya is important to patient  Adjustment to Illness:  Patient in good spirits and motivated to recover quickly    Physical Health  Reason for Admission:  No diagnosis found.  Services Needed/Recommended:  TCU    Mental Health/Chemical Dependency  Diagnosis:  N/A  Support/Services in Place:  N/A  Services Needed/Recommended:  N/A    Support System  Significant relationship at present time:  Adult children  Family of origin is available for support:  Grandson currently lives in home with patient  Other support available:  Adult son willing to assist in home as well after TCU stay  Gaps in support system:  None noted at this time  Patient is caregiver to:  None     Provider Information   Primary Care Physician:  Los Lambert   [phone]   Clinic:  [facility] [address] [facility]  / [facility]      :  Unknown    Financial   Income Source:  Social Security/ Pension  Financial Concerns:  No financial issue  Insurance:    Payor/Plan Subscriber Name Rel Member # Group #   MEDICARE - MEDICARE LUIS ROE  [id number]       ATTN CLAIMS, [address]   BCBS - BCBS OF [employer]ERR[employer]IMENA [employer]ARRYKY  [id number]      [address] [address]       Discharge Plan   Patient and family discharge goal:  PStient willing to discharge to  rehab, prefers [facility] in [place]  Provided education on discharge plan:  YES  Patient agreeable to discharge plan:  YES  A list of Medicare Certified Facilities was provided to the patient and/or family to encourage patient choice. Patient's choices for facility are:  Pt preferred facility is [facility] where she had been in TCU 6 years ago, referral sent; Next closest TCU is in [place] which pt declined  Will NH provide Skilled rehabilitation or complex medical:  YES  General information regarding anticipated insurance coverage and possible out of pocket cost was discussed. Patient and patient's family are aware patient may incur the cost of transportation to the facility, pending insurance payment: YES  Barriers to discharge:  Medical stability, accepting facility    Discharge Recommendations   Anticipated Disposition:  Facility:  [facility]  Transportation Needs:  Family:  grandson and son will be at bedside to transport when discharge scheduled  Name of Transportation Company and Phone:  N/A    Additional comments   Patient agreeable to rehab stay and understands she will possibly need PT/OT through home care to support transition home later on. Pt is understanding of plan and will await discharge notification. ZAK sent referral to Kalani ([phone]) at [facility] and left message as no one available to discuss referrals over weekend.    Yajaira DURAN  Weekend ZAK  Pager: [phone]  On Call Pager: [phone] 4pm - Midnight

## 2018-12-01 NOTE — PLAN OF CARE
Problem: Patient Care Overview  Goal: Plan of Care/Patient Progress Review  Problem: Patient Care Overview  Goal: Plan of Care/Patient Progress Review  Outcome: Improving  Pt A&O x4. CMS intact. Temp elevated at 100.0, all other VS WDL. Pain currently managed with 1mg Dilaudid. Sensitive to narcotics, bed alarm currently on. Dressing is CDI, with scant drainage, marked. Has been out of the bed to use the commode, shaky when transferring. Had calf pain under operative left knee and sent to ultrasound 11/30 night. No DVT. No BM yet since surgery but passing gas and has active bowel sounds. Urgency incontinent and using briefs, voiding adequately. PARI drain, emptied 100 mL tonight. Uses CPM machine but only tolerates a couple hours at a time. Pt currently resting in bed. Able to make needs known and call light clipped to bed.

## 2018-12-01 NOTE — PLAN OF CARE
Problem: Patient Care Overview  Goal: Plan of Care/Patient Progress Review  Outcome: Improving  VSS. Afebrile. Did not come up with pneumo boots or CPM from PACU. Pneumoboots and CPM ordered for unit stay. Tolerating regular diet. IVFs infusing. Drsg with ace wrap c/d/i. BLE cool with +1 pedal pulse. Ice to knee. Pain controlled with ice and tylenol thus far. States sensitive to narcotics and updated oncoming nurse with concern. Bed alarm on but pt able to use call light and make needs known. Will continue to monitor.

## 2018-12-01 NOTE — PROGRESS NOTES
Orthopedic Surgery Progress Note    Subjective / Interval Events:   Patient resting in bed; some pain in operative knee overnight. Has not started using the CPM yet.  Understands the plan for therapy.    Objective:   Vital Signs Temp (24hrs), Av.4  F (36.9  C), Min:96.2  F (35.7  C), Max:100  F (37.8  C)    /70 (BP Location: Left arm)  Pulse 83  Temp 99.4  F (37.4  C) (Oral)  Resp 19  Ht 1.524 m (5')  Wt 65 kg (143 lb 4.8 oz)  SpO2 94%  BMI 27.99 kg/m2    Physical Examination   General: no acute distress  CV: palpable pulses  Resp: Nonlabored breathing  LLE: long leg soft dressing in tact; clean and dry    PARI drain in place; 20cc and 130cc output since OR    +TA/GSC/EHL/FHL; SILT DP/SP/Ravin/Saph    Palpable dorsalis pedis pulse    Labs (Last 24 hour):   Lab Results   Component Value Date    WBC 7.9 2012    HGB 12.1 2018    HCT 33.6 2012    MCV 96 2012     2012    RDW 14.4 2012     Lab Results   Component Value Date    BUN 12 2018     2018    CO2 28 2018    ANIONGAP 4 2018     All cultures:  No results for input(s): CULT in the last 168 hours.  Assessment / Plan:   This is a 87 year old female s/p left TKA on 2018 by Dr. Jenkins    Pain control; continue to wean medications as tolerated  CV: Monitor HR/BP  Resp: Encourage IS  Heme/ID: Monitor Hgb; 12.1 this morning  GI/: diet as tolerated  MSK: WBAT, PT/OT  Post-op antibiotics completed  DVT ppx: aspirin  Use CPM daily  Monitor PARI drain output; likely remove tomorrow    Plan for discharge in 1-2 days    Ciro Wilkes MD  Orthopaedic Surgery, Adult Reconstruction Fellow  Pager [phone]

## 2018-12-01 NOTE — PROGRESS NOTES
Orthopaedic Surgery Post op Check  November 30, 2018    Subjective: SEen on 8A. Pain controlled. Denies numbness, tingling, chest pain, shortness of breath.     Objective: /62 (BP Location: Left arm)  Pulse 69  Temp 96.2  F (35.7  C) (Axillary)  Resp 16  Ht 1.524 m (5')  Wt 65 kg (143 lb 4.8 oz)  SpO2 96%  BMI 27.99 kg/m2    General: NAD, alert and oriented, cooperative with exam.   Cardio: RRR, extremities wwp.   Respiratory: Non-labored breathing.  MSK: LLE: Toes wwp, DP 2+, bcr in all toes.  +EHL/FHL/GSC/TA with 5/5 strength. SILT SP/DP/Sa/Monge/T. Dressing c/d/i. Drain with serosanguinous output.    Labs:  Hemoglobin   Date Value Ref Range Status   11/30/2018 14.5 11.7 - 15.7 g/dL Final       Assessment and Plan: Luis Roe is a 87 year old female s/p L primary TKA on 11/30 with Dr. Jenkins. Doing well post-operatively.     Ortho Primary  Activity: Up with assist until independent.  Weight bearing status: WBAT LLE  Pain management: Transition from IV to PO as tolerated.    Antibiotics: Ancef x 24 hours.  Diet: Begin with clear fluids and progress diet as tolerated.   DVT prophylaxis: Aspirin and mechanical while in the hospital, discharge on Aspirin 325mg BID x 4 weeks.  Imaging: PACU.  Labs: Hgb POD#1-2.  Dressings: Keep clean, dry and intact until follow up   Drains: Document output per shift, discontinue when <30cc/shift.   Physical Therapy/Occupational Therapy: Eval and treat.  Consults: Hospitalist.  Follow-up: Clinic with Dr. Jenkins in 4 weeks   With left knee x-rays needed.   Disposition: Pending progress with therapies, pain control on orals, and medical stability, anticipate discharge to home vs. TCU on POD #2-3.    Heriberto Acosta MD  Orthopaedic Surgery Resident, PGY-4  Pager: [phone]

## 2018-12-01 NOTE — PROGRESS NOTES
12/01/18 0955   Quick Adds   Type of Visit Initial Occupational Therapy Evaluation   Living Environment   Lives With alone   Living Arrangements house   Home Accessibility grab bars present (bathtub);tub/shower is not walk in;stairs to enter home;stairs within home   Number of Stairs to Enter Home 1   Number of Stairs Within Home 12  (6 up and 6 down)   Stair Railings at Home inside, present on left side   Transportation Available family or friend will provide   Living Environment Comment Split level home.  Bedroom, bathroom, kitchen on upper level.  Laundry on lower.  Pt has tub/shower with grab bars, shower chair.  Pt has vanity by toilet.    Self-Care   Dominant Hand left   Usual Activity Tolerance moderate   Current Activity Tolerance fair   Regular Exercise other (see comments)   Equipment Currently Used at Home none   Activity/Exercise/Self-Care Comment Pt has shower chair, FWW, 4WW, cane.  Thinks she has some ADL equipment but not sure which.   Functional Level Prior   Ambulation 0-->independent   Transferring 0-->independent   Toileting 0-->independent   Bathing 0-->independent   Dressing 0-->independent   Eating 0-->independent   Communication 0-->understands/communicates without difficulty   Swallowing 0-->swallows foods/liquids without difficulty   Cognition 0 - no cognition issues reported   Fall history within last six months yes   Number of times patient has fallen within last six months 1   Which of the above functional risks had a recent onset or change? ambulation;transferring;toileting;bathing;dressing   Prior Functional Level Comment Pt previously IND with ADL/IADL.  Did not use AD   General Information   Onset of Illness/Injury or Date of Surgery - Date 11/30/18   Referring Physician Beto Jenkins MD   Patient/Family Goals Statement to get back to normal   Additional Occupational Profile Info/Pertinent History of Current Problem s/p L TKA   Precautions/Limitations fall precautions    Weight-Bearing Status - LUE full weight-bearing   Weight-Bearing Status - RUE full weight-bearing   Weight-Bearing Status - LLE weight-bearing as tolerated   Weight-Bearing Status - RLE full weight-bearing   Cognitive Status Examination   Orientation orientation to person, place and time   Level of Consciousness alert   Able to Follow Commands WNL/WFL   Personal Safety (Cognitive) WNL/WFL   Cognitive Comment No concerns reported or observed.  Pt is anxious about pain and recoils from light touch in anticipation of pain.   Visual Perception   Visual Perception Comments Denies concerns   Sensory Examination   Sensory Comments denies concerns   Pain Assessment   Patient Currently in Pain Yes, see Vital Sign flowsheet   Integumentary/Edema   Integumentary/Edema Comments baseline dependent edema in L ankle that reduces with elevation   Range of Motion (ROM)   ROM Comment WFL BUE   Strength   Strength Comments Not tested, appears WFL during session   Mobility   Bed Mobility Comments CGA   Transfer Skills   Transfer Comments min A   Lower Body Dressing   Level of [place]: Dress Lower Body maximum assist (25% patients effort)   Grooming   Level of [place]: Grooming stand-by assist   Instrumental Activities of Daily Living (IADL)   Previous Responsibilities meal prep;housekeeping;laundry;shopping;medication management;finances;driving   IADL Comments Pt's son/grandson usually complete outdoor work, otherwise pt is independent   Activities of Daily Living Analysis   Impairments Contributing to Impaired Activities of Daily Living balance impaired;fear and anxiety;pain;ROM decreased   General Therapy Interventions   Planned Therapy Interventions IADL retraining;ADL retraining;bed mobility training;strengthening;transfer training   Clinical Impression   Criteria for Skilled Therapeutic Interventions Met yes, treatment indicated   OT Diagnosis decreased independence with ADL   Influenced by the following impairments  "pain, decreased knee ROM impacting reach to feet, anxiety, decreased activity tolerance   Assessment of Occupational Performance 3-5 Performance Deficits   Identified Performance Deficits transfers, dressing, bathing, toileting   Clinical Decision Making (Complexity) Moderate complexity   Therapy Frequency daily   Predicted Duration of Therapy Intervention (days/wks) 4 days   Anticipated Equipment Needs at Discharge bathing equipment;dressing equipment   Anticipated Discharge Disposition Transitional Care Facility   Risks and Benefits of Treatment have been explained. Yes   Patient, Family & other staff in agreement with plan of care Yes   Clinical Impression Comments Pt presents with decreased independence with ADL 2/2 pain, decreased reach to feet, limited activity tolerance, and anxiety.  Pt would benefit from skilled OT to address and maximize safety/IND with ADL   [facility] AM-[facility] TM \"6 Clicks\"   2016, Trustees of [facility], under license to [employer].  All rights reserved.   6 Clicks Short Forms Daily Activity Inpatient Short Form   [facility]  \"6 Clicks\" Daily Activity Inpatient Short Form   1. Putting on and taking off regular lower body clothing? 2 - A Lot   2. Bathing (including washing, rinsing, drying)? 2 - A Lot   3. Toileting, which includes using toilet, bedpan or urinal? 2 - A Lot   4. Putting on and taking off regular upper body clothing? 4 - None   5. Taking care of personal grooming such as brushing teeth? 4 - None   6. Eating meals? 4 - None   Daily Activity Raw Score (Score out of 24.Lower scores equate to lower levels of function) 18   Total Evaluation Time   Total Evaluation Time (Minutes) 15     "

## 2018-12-01 NOTE — PLAN OF CARE
Problem: Patient Care Overview  Goal: Plan of Care/Patient Progress Review  Discharge Planner PT   Patient plan for discharge: TCU  Current status: patient very limited by pain. Engaged in L LE exercises for ROM and strength. Patient demos mod I bed mobility and needing CGA for sit <> stands and stand pivot with use of walker. Patient refuses ambulating during AM PT session.   Barriers to return to prior living situation: level of assist, medical stability  Recommendations for discharge: TCU  Rationale for recommendations: pt would benefit from TCU to progress indep with bed mobility, transfers, gait, and stairs       Entered by: Alee Bergman 12/01/2018 8:45 AM

## 2018-12-01 NOTE — PROVIDER NOTIFICATION
Kyree updated in person about pt's c/o calf pain on her Left side. Pt's pain is in the middle of the calf well below her incision area. MD to order ultrasound.

## 2018-12-02 ENCOUNTER — APPOINTMENT (OUTPATIENT)
Dept: PHYSICAL THERAPY | Facility: CLINIC | Age: 83
DRG: 470 | End: 2018-12-02
Attending: ORTHOPAEDIC SURGERY
Payer: MEDICARE

## 2018-12-02 LAB
ANION GAP SERPL CALCULATED.3IONS-SCNC: 7 MMOL/L (ref 3–14)
BUN SERPL-MCNC: 11 MG/DL (ref 7–30)
CALCIUM SERPL-MCNC: 8.5 MG/DL (ref 8.5–10.1)
CHLORIDE SERPL-SCNC: 107 MMOL/L (ref 94–109)
CO2 SERPL-SCNC: 26 MMOL/L (ref 20–32)
CREAT SERPL-MCNC: 0.62 MG/DL (ref 0.52–1.04)
GFR SERPL CREATININE-BSD FRML MDRD: >90 ML/MIN/1.7M2
GLUCOSE SERPL-MCNC: 122 MG/DL (ref 70–99)
HGB BLD-MCNC: 12.3 G/DL (ref 11.7–15.7)
POTASSIUM SERPL-SCNC: 3.8 MMOL/L (ref 3.4–5.3)
SODIUM SERPL-SCNC: 140 MMOL/L (ref 133–144)

## 2018-12-02 PROCEDURE — 99232 SBSQ HOSP IP/OBS MODERATE 35: CPT | Performed by: INTERNAL MEDICINE

## 2018-12-02 PROCEDURE — 80048 BASIC METABOLIC PNL TOTAL CA: CPT | Performed by: ORTHOPAEDIC SURGERY

## 2018-12-02 PROCEDURE — 99207 ZZC CDG-MDM COMPONENT: MEETS MODERATE - UP CODED: CPT | Performed by: INTERNAL MEDICINE

## 2018-12-02 PROCEDURE — 12000001 ZZH R&B MED SURG/OB UMMC

## 2018-12-02 PROCEDURE — A9270 NON-COVERED ITEM OR SERVICE: HCPCS | Mod: GY | Performed by: STUDENT IN AN ORGANIZED HEALTH CARE EDUCATION/TRAINING PROGRAM

## 2018-12-02 PROCEDURE — 97116 GAIT TRAINING THERAPY: CPT | Mod: GP

## 2018-12-02 PROCEDURE — 97530 THERAPEUTIC ACTIVITIES: CPT | Mod: GP

## 2018-12-02 PROCEDURE — 25000132 ZZH RX MED GY IP 250 OP 250 PS 637: Mod: GY | Performed by: INTERNAL MEDICINE

## 2018-12-02 PROCEDURE — 40000193 ZZH STATISTIC PT WARD VISIT

## 2018-12-02 PROCEDURE — 85018 HEMOGLOBIN: CPT | Performed by: ORTHOPAEDIC SURGERY

## 2018-12-02 PROCEDURE — 25000132 ZZH RX MED GY IP 250 OP 250 PS 637: Mod: GY | Performed by: STUDENT IN AN ORGANIZED HEALTH CARE EDUCATION/TRAINING PROGRAM

## 2018-12-02 PROCEDURE — A9270 NON-COVERED ITEM OR SERVICE: HCPCS | Mod: GY | Performed by: INTERNAL MEDICINE

## 2018-12-02 PROCEDURE — 36415 COLL VENOUS BLD VENIPUNCTURE: CPT | Performed by: ORTHOPAEDIC SURGERY

## 2018-12-02 PROCEDURE — 97110 THERAPEUTIC EXERCISES: CPT | Mod: GP

## 2018-12-02 RX ORDER — AMOXICILLIN 250 MG
1 CAPSULE ORAL 2 TIMES DAILY PRN
Qty: 100 TABLET | DISCHARGE
Start: 2018-12-02

## 2018-12-02 RX ORDER — ASPIRIN 325 MG
325 TABLET, DELAYED RELEASE (ENTERIC COATED) ORAL 2 TIMES DAILY
Qty: 40 TABLET | DISCHARGE
Start: 2018-12-03 | End: 2018-12-31

## 2018-12-02 RX ORDER — ACETAMINOPHEN 325 MG/1
650 TABLET ORAL EVERY 4 HOURS PRN
Qty: 100 TABLET | Refills: 1 | DISCHARGE
Start: 2018-12-03

## 2018-12-02 RX ORDER — HYDROMORPHONE HYDROCHLORIDE 2 MG/1
1-2 TABLET ORAL EVERY 4 HOURS PRN
Qty: 40 TABLET | Refills: 0 | Status: SHIPPED | DISCHARGE
Start: 2018-12-02 | End: 2018-12-03

## 2018-12-02 RX ADMIN — VITAMIN D, TAB 1000IU (100/BT) 1000 UNITS: 25 TAB at 08:49

## 2018-12-02 RX ADMIN — ASPIRIN 325 MG: 325 TABLET, DELAYED RELEASE ORAL at 08:22

## 2018-12-02 RX ADMIN — ACETAMINOPHEN 975 MG: 325 TABLET, FILM COATED ORAL at 02:38

## 2018-12-02 RX ADMIN — Medication 2 MG: at 18:28

## 2018-12-02 RX ADMIN — Medication 1 MG: at 14:42

## 2018-12-02 RX ADMIN — CETIRIZINE HYDROCHLORIDE 10 MG: 10 TABLET, FILM COATED ORAL at 21:22

## 2018-12-02 RX ADMIN — ACETAMINOPHEN 975 MG: 325 TABLET, FILM COATED ORAL at 18:28

## 2018-12-02 RX ADMIN — CALCIUM 500 MG: 500 TABLET ORAL at 08:49

## 2018-12-02 RX ADMIN — SENNOSIDES AND DOCUSATE SODIUM 1 TABLET: 8.6; 5 TABLET ORAL at 08:49

## 2018-12-02 RX ADMIN — ASPIRIN 325 MG: 325 TABLET, DELAYED RELEASE ORAL at 19:40

## 2018-12-02 RX ADMIN — Medication 1 MG: at 08:21

## 2018-12-02 RX ADMIN — Medication 2 MG: at 02:38

## 2018-12-02 RX ADMIN — Medication 1 MG: at 13:33

## 2018-12-02 RX ADMIN — Medication 1 MG: at 07:03

## 2018-12-02 RX ADMIN — BISACODYL 10 MG: 10 SUPPOSITORY RECTAL at 08:49

## 2018-12-02 RX ADMIN — ACETAMINOPHEN 975 MG: 325 TABLET, FILM COATED ORAL at 11:14

## 2018-12-02 RX ADMIN — LATANOPROST 1 DROP: 50 SOLUTION OPHTHALMIC at 21:23

## 2018-12-02 RX ADMIN — MULTIPLE VITAMINS W/ MINERALS TAB 1 TABLET: TAB at 08:49

## 2018-12-02 RX ADMIN — Medication 2 MG: at 22:26

## 2018-12-02 ASSESSMENT — ACTIVITIES OF DAILY LIVING (ADL)
ADLS_ACUITY_SCORE: 12

## 2018-12-02 NOTE — PROGRESS NOTES
Orthopedic Surgery Progress Note    Subjective / Interval Events:   Patient awake in bed; doing well; pain controlled; did not CPM last night but understands the importance of CPM use today. Planning on going to TCU at discharge.    Objective:   Vital Signs Temp (24hrs), Av.3  F (36.8  C), Min:96.9  F (36.1  C), Max:99.3  F (37.4  C)    /65  Pulse 86  Temp 96.9  F (36.1  C) (Oral)  Resp 18  Ht 1.524 m (5')  Wt 65 kg (143 lb 4.8 oz)  SpO2 95%  BMI 27.99 kg/m2    Date 18 0700 - 18 0659   Shift 1055-8797 5353-0962 0958-3779 24 Hour Total   I  N  T  A  K  E   Shift Total  (mL/kg)       O  U  T  P  U  T   Urine 450   450    Shift Total  (mL/kg) 450  (6.92)   450  (6.92)   Weight (kg) 65 65 65 65     Physical Examination   General: no acute distress  CV: palpable pulses  Resp: Nonlabored breathing  LLE: long leg soft dressing in tact; clean and dry    PARI with 35cc and 30cc output last 2 RN shifts       Drain removed this morning    +TA/GSC/EHL/FHL; SILT DP/SP/Ravin/Saph    Palpable dorsalis pedis pulse    Labs (Last 24 hour):   Lab Results   Component Value Date    WBC 7.9 2012    HGB 12.3 2018    HCT 33.6 2012    MCV 96 2012     2012    RDW 14.4 2012     Lab Results   Component Value Date    BUN 11 2018     2018    CO2 26 2018    ANIONGAP 7 2018     All cultures:  No results for input(s): CULT in the last 168 hours.  Assessment / Plan:   This is a 87 year old female s/p left TKA on 2018 by Dr. Jenkins    Pain control; continue to wean medications as tolerated  CV: Monitor HR/BP  Resp: Encourage IS  Heme/ID: Monitor Hgb 12.3 this morning  GI/: diet as tolerated  MSK: WBAT, PT/OT  Post-op antibiotics completed  PARI drain removed this morning  DVT ppx: aspirin  Encourage CPM use throughout the day/night    Plan for discharge to TCU tomorrow    Ciro Wilkes MD  Orthopaedic Surgery, Adult Reconstruction Fellow  Pager  [phone]

## 2018-12-02 NOTE — PLAN OF CARE
Problem: Patient Care Overview  Goal: Plan of Care/Patient Progress Review  Discharge Planner PT   Patient plan for discharge: TCU  Current status: patient demos mod I bed mobility from elevated HOB, sit <> stands with CGA and FWW. Ambulates 8 feet x2 + 15 feet x2 with FWW and CGA. Facilitated LE exercises, ROM 0-12-78  Barriers to return to prior living situation: level of assist, medical stability  Recommendations for discharge: TCU  Rationale for recommendations: pt would benefit from continued skilled PT to progress activity tolerance, strength, indep with gait       Entered by: Alee Bergman 12/02/2018 11:03 AM

## 2018-12-02 NOTE — PROGRESS NOTES
[facility] Internal Medicine Progress Note            Interval History:   Record reviewed.  Seen with RN.  Increase pain after PT.  Took 1 mg with repeat 1 hr earlier (wellbefore therapy).  No confusion, sedation or respiratory depression.  Ambulated without LH.  No CP, SOB, minimal cough to clear throat.  No nausea, reflux, abd pain.  BM this AM with supp.  Voiding OK.           Medications:   All medications reviewed today          Physical Exam:   Blood pressure 132/65, pulse 86, temperature 96.9  F (36.1  C), temperature source Oral, resp. rate 18, height 1.524 m (5'), weight 65 kg (143 lb 4.8 oz), SpO2 95 %.    Intake/Output Summary (Last 24 hours) at 12/01/18 1518  Last data filed at 12/01/18 1440   Gross per 24 hour   Intake              860 ml   Output             1590 ml   Net             -730 ml       General:  Alert.  Appropriate.  No distress.  Off O2.     Heent:      Neck:    Skin:    Chest:  clear    Cardiac:  Reg without gallop, murmur.  No JVD.     Abdomen:  Non distended, soft, non-tender.  BS normal.     Extremities:  Perfused.  No edema, calf, posterior thigh tenderness to suggest DVT.     Neuro:            Data:     Results for orders placed or performed during the hospital encounter of 11/30/18 (from the past 24 hour(s))   Hemoglobin   Result Value Ref Range    Hemoglobin 12.3 11.7 - 15.7 g/dL   Basic metabolic panel   Result Value Ref Range    Sodium 140 133 - 144 mmol/L    Potassium 3.8 3.4 - 5.3 mmol/L    Chloride 107 94 - 109 mmol/L    Carbon Dioxide 26 20 - 32 mmol/L    Anion Gap 7 3 - 14 mmol/L    Glucose 122 (H) 70 - 99 mg/dL    Urea Nitrogen 11 7 - 30 mg/dL    Creatinine 0.62 0.52 - 1.04 mg/dL    GFR Estimate >90 >60 mL/min/1.7m2    GFR Estimate If Black >90 >60 mL/min/1.7m2    Calcium 8.5 8.5 - 10.1 mg/dL                  Assessment and Plan:   1. Left total knee arthroplasty.  Indication:  Osteoarthritis.  Increase  pain after therapy - needs to take  2 mg dilaudid dose timed  before session.   Tolerating.  Respiratory depression related to past excess opiate use at time of right total hip arthroplasty in 2012.  LLE doppler neg DVT 11/30.       2.  Osteoarthritis of right knee with anticipated right total knee arthroplasty in the future.   3.  Acute blood loss without anemia.   4.  Elevated blood sugar consistent with prediabetes. Adequate.   5.  Essential hypertension, adequate control. Off amlodipine.    6.  Hyperlipidemia, on red yeast rice, niacin and fish oil. On hold.   7.  Osteoporosis.   8.  Environmental allergies on bedtime Zyrtec.   9.  Anxiety disorder, fairly well-compensated on low dose Paxil.   10.  Constipation.     PLAN:  1)  Dilaudid 2 mg timed pre therapy (reveiwed with RN)   2)  IS, bowel meds  ASA, mobilize.  4)  Trend labs.  5)  Monitor clinically.    Disposition Plan   Expected discharge in 1  days to transitional care unit .     Entered: Bob Dennis 12/02/2018, 11:50 AM              Attestation:  I have reviewed today's vital signs, notes, medications, labs and imaging.     Bob Dennis MD

## 2018-12-02 NOTE — PLAN OF CARE
Problem: Patient Care Overview  Goal: Plan of Care/Patient Progress Review  Outcome: Improving    VS: VSS; alert and oriented x4   O2: >90%; capnography discontinued 24 hours post op   Output: Voiding spontaneously in BSC   Last BM: 11/29/18   Activity: Transfers with walker and assist of 1 to BSC; CPM in use from 1530 to 2000   Skin: Intact w/ exception of L knee incision    Pain: Controlled with scheduled tylenol and prn dilaudid q4h   CMS: Intact; denies numbness/tingling    Dressing:   L knee incision CDI    LLE PARI insertion site serosanguinous drainage present; dressing reinforced and ace wrap changed    Diet: Regular; tolerating well    LDA:   L PIV SL WDL    LLE PARI    Equipment: Walker   Additional Info: Pt calm and cooperative with nursing cares. Able to make needs known. Fall prevention education reinforced; bed alarm in use.

## 2018-12-02 NOTE — PLAN OF CARE
Problem: Patient Care Overview  Goal: Plan of Care/Patient Progress Review  Outcome: Improving  Alert and oriented X 4. Able to make needs known. Call light in reach. Left knee pain managed with Dilaudid 2 mg PO Q 4 hours. Ace/dressing CDI. PARI patent with small amount of serosanguinous drainage.  Ice applied to knee.  CMS/neuros WNLs. Up to BSC with assist of 1, gait belt and walker. Voiding spontaneously, has urgency. Passing flatus. Good PO intake. PIV patent, saline locked.  Tolerating activity. Denies nausea, headache, dizziness, chest pain or SOB. Appears to be sleeping during rounds. Continue with plan of care.

## 2018-12-02 NOTE — PLAN OF CARE
Problem: Patient Care Overview  Goal: Plan of Care/Patient Progress Review  Outcome: Improving  Patient A/Ox4. VSS. Denies CP, SOB, dizziness/LH. LSCTA. Dulcolax suppository given with good result.. Voiding per bedside commode with assist of 1 using walker. Pain is managed with Dilaudid 1-2 mg po . CMS intact. Dressing to left knee CDI, ice applied. Tolerating regular diet.. IS encouraged. Activity as tolerated . IV SL Bed alarm on.. . Patient has demonstrated ability to call appropriately. Patient is resting with call light within reach. Will continue to monitor.

## 2018-12-03 ENCOUNTER — APPOINTMENT (OUTPATIENT)
Dept: PHYSICAL THERAPY | Facility: CLINIC | Age: 83
DRG: 470 | End: 2018-12-03
Attending: ORTHOPAEDIC SURGERY
Payer: MEDICARE

## 2018-12-03 ENCOUNTER — APPOINTMENT (OUTPATIENT)
Dept: OCCUPATIONAL THERAPY | Facility: CLINIC | Age: 83
DRG: 470 | End: 2018-12-03
Attending: ORTHOPAEDIC SURGERY
Payer: MEDICARE

## 2018-12-03 VITALS
HEART RATE: 86 BPM | RESPIRATION RATE: 14 BRPM | BODY MASS INDEX: 28.13 KG/M2 | HEIGHT: 60 IN | SYSTOLIC BLOOD PRESSURE: 114 MMHG | OXYGEN SATURATION: 93 % | WEIGHT: 143.3 LBS | DIASTOLIC BLOOD PRESSURE: 63 MMHG | TEMPERATURE: 99 F

## 2018-12-03 PROCEDURE — 40000133 ZZH STATISTIC OT WARD VISIT: Performed by: OCCUPATIONAL THERAPIST

## 2018-12-03 PROCEDURE — 25000132 ZZH RX MED GY IP 250 OP 250 PS 637: Mod: GY | Performed by: INTERNAL MEDICINE

## 2018-12-03 PROCEDURE — A9270 NON-COVERED ITEM OR SERVICE: HCPCS | Mod: GY | Performed by: INTERNAL MEDICINE

## 2018-12-03 PROCEDURE — 99232 SBSQ HOSP IP/OBS MODERATE 35: CPT | Performed by: INTERNAL MEDICINE

## 2018-12-03 PROCEDURE — A9270 NON-COVERED ITEM OR SERVICE: HCPCS | Mod: GY | Performed by: STUDENT IN AN ORGANIZED HEALTH CARE EDUCATION/TRAINING PROGRAM

## 2018-12-03 PROCEDURE — 25000132 ZZH RX MED GY IP 250 OP 250 PS 637: Mod: GY | Performed by: STUDENT IN AN ORGANIZED HEALTH CARE EDUCATION/TRAINING PROGRAM

## 2018-12-03 PROCEDURE — 99207 ZZC CDG-MDM COMPONENT: MEETS MODERATE - UP CODED: CPT | Performed by: INTERNAL MEDICINE

## 2018-12-03 PROCEDURE — 97110 THERAPEUTIC EXERCISES: CPT | Mod: GP

## 2018-12-03 PROCEDURE — 97535 SELF CARE MNGMENT TRAINING: CPT | Mod: GO | Performed by: OCCUPATIONAL THERAPIST

## 2018-12-03 PROCEDURE — 40000193 ZZH STATISTIC PT WARD VISIT

## 2018-12-03 PROCEDURE — 97116 GAIT TRAINING THERAPY: CPT | Mod: GP

## 2018-12-03 RX ORDER — HYDROMORPHONE HYDROCHLORIDE 2 MG/1
TABLET ORAL
Qty: 30 TABLET | Refills: 0 | Status: SHIPPED | DISCHARGE
Start: 2018-12-03

## 2018-12-03 RX ADMIN — SENNOSIDES AND DOCUSATE SODIUM 2 TABLET: 8.6; 5 TABLET ORAL at 07:45

## 2018-12-03 RX ADMIN — MULTIPLE VITAMINS W/ MINERALS TAB 1 TABLET: TAB at 07:45

## 2018-12-03 RX ADMIN — Medication 2 MG: at 04:26

## 2018-12-03 RX ADMIN — Medication 2 MG: at 10:11

## 2018-12-03 RX ADMIN — CALCIUM 500 MG: 500 TABLET ORAL at 07:45

## 2018-12-03 RX ADMIN — VITAMIN D, TAB 1000IU (100/BT) 1000 UNITS: 25 TAB at 07:45

## 2018-12-03 RX ADMIN — ACETAMINOPHEN 975 MG: 325 TABLET, FILM COATED ORAL at 04:26

## 2018-12-03 RX ADMIN — Medication 2 MG: at 13:47

## 2018-12-03 RX ADMIN — ASPIRIN 325 MG: 325 TABLET, DELAYED RELEASE ORAL at 07:46

## 2018-12-03 ASSESSMENT — ACTIVITIES OF DAILY LIVING (ADL)
ADLS_ACUITY_SCORE: 12

## 2018-12-03 NOTE — DISCHARGE SUMMARY
ORTHOPAEDIC DISCHARGE SUMMARY     Date of Admission: 11/30/2018  Date of Discharge: 12-3-2018  Disposition: TCU  Staff Physician: Beto Jenkins MD  Primary Care Provider: Los Lambert    DISCHARGE DIAGNOSIS:    Osteoarthritis Left Knee, Bilateral Knee Pain    PROCEDURES: Procedure(s):  Left Total Knee Replacement on 11/30/2018    BRIEF HISTORY:   Luis Roe is a 87 year old female with known bilateral knee osteoarthritis. She has a history of right total hip arthroplasty with pelvis reconstruction using autograft bone on 10/30/2012. She has had progressive knee pain over the past 2-3 years. She attempted conservative management in the form of corticosteroid injections, OTC pain medications, and activity modification. She ultimately failed conservative management. The risks and benefits of total knee arthroplasty were discussed with the patient and she elected to proceed. Signed informed consent was obtained and placed in the chart.     HOSPITAL COURSE:    Surgery was uncomplicated. Luis Roe has done well post-operatively. Medicine was consulted post operatively to aid in management of medical comorbidities. The patient received routine nursing cares and is medically stable. Vital signs are stable. The patient is tolerating a regular diet without GI distress/nausea or vomiting. Voiding spontaneously. All PT/OT goals have been met for safe mobility. Pain is now controlled on oral medications. Stool softeners have been used while taking pain medications to help prevent constipation. Luis Roe is deemed medically safe to discharge to TCU.     Antibiotics:  given periop and 24 hours postop.   DVT prophylaxis:  Aspirin 325 mg BID for 4 weeks  PT Progress: PT/OT goals ongoing  Pain Meds:  She was weaned off all IV pain meds by discharge.  Inpatient Events: No significant events or complications.     Discharge orders and instructions as below.    FOLLOWUP:      Future Appointments4 weeks post op with   Gregory.  An appointment has been requested.     Appointments at [facility]. Call [phone] if you haven't heard regarding these appointments within 7 days of discharge.    PLANNED DISCHARGE ORDERS:        Current Discharge Medication List      START taking these medications    Details   acetaminophen (TYLENOL) 325 MG tablet Take 2 tablets (650 mg) by mouth every 4 hours as needed for other (multimodal surgical pain management along with NSAIDS and opioid medication as indicated based on pain control and physical function.)  Qty: 100 tablet, Refills: 1    Associated Diagnoses: Status post knee surgery      aspirin (ASA) 325 MG EC tablet Take 1 tablet (325 mg) by mouth 2 times daily for 28 days  Qty: 40 tablet    Associated Diagnoses: Status post knee surgery      HYDROmorphone (DILAUDID) 2 MG tablet For pain of 3-6 give 1 mg, for pain of 7-10 give 2 mg every 3 hours as needed.  Qty: 30 tablet, Refills: 0    Associated Diagnoses: Status post knee surgery      senna-docusate (SENOKOT-S/PERICOLACE) 8.6-50 MG tablet Take 1 tablet by mouth 2 times daily as needed for constipation  Qty: 100 tablet    Associated Diagnoses: Status post knee surgery         CONTINUE these medications which have NOT CHANGED    Details   amLODIPine (NORVASC) 5 MG tablet Take 5 mg by mouth      amoxicillin (AMOXIL) 500 MG capsule Take 4 capsules one hour prior to dental work.  Qty: 4 capsule, Refills: 1    Associated Diagnoses: OA (osteoarthritis) of hip      calcium carbonate (OS-ANGELINA 600 MG [place]ski. CA) 600 MG tablet Take 1,200 mg by mouth 2 times daily.      cetirizine (RA CETIRIZINE) 10 MG tablet Take 10 mg by mouth      latanoprost (XALATAN) 0.005 % ophthalmic solution INSTILL 1 DROP INTO BOTH EYES QPM UTD      Multiple Vitamin (DAILY MULTIVITAMIN PO)       travoprost, BAK Free, (TRAVATAN Z) 0.004 % ophthalmic solution INT 1 GTT INTO OU QPM AS DIRECTED      ascorbic acid 1000 MG TABS tablet Take 1,000 mg by mouth once  daily. Takes 2 tablets (2,000mg)   Indications: supplement      cholecalciferol (VITAMIN D-1000 MAX ST) 1000 units TABS Take 1,000 Units by mouth once daily. Takes 2 tablets (2,000mg)   Indications: PREVENTION OF VITAMIN D DEFICIENCY      niacin 250 MG tablet Take 500 mg by mouth once daily.      Omega-3 Fatty Acids (FISH OIL PO) Take 1,000 mg by mouth         STOP taking these medications       Boswellia Jp (BOSWELLIA PO) Comments:   Reason for Stopping:         IBUPROFEN PO Comments:   Reason for Stopping:         Red Yeast Rice 600 MG TABS Comments:   Reason for Stopping:                 Discharge Procedure Orders  General info for SNF   Order Comments: Length of Stay Estimate: Short Term Care: Estimated # of Days <30  Condition at Discharge: Improving  Level of care:skilled   Rehabilitation Potential: Excellent  Admission H&P remains valid and up-to-date: Yes  Recent Chemotherapy: N/A  Use Nursing Home Standing Orders: Yes     Mantoux instructions   Order Comments: Give two-step Mantoux (PPD) Per Facility Policy Yes     Reason for your hospital stay   Order Comments: Left knee replacement surgery     Wound care (specify)   Order Comments: Site:   Left knee  Instructions:  As directed below     Follow Up and recommended labs and tests   Order Comments: Follow up with Dr. Jenkins in 4 weeks.  The following labs/tests are recommended: x rays of left knee.     Activity - Up with assistive device   Order Specific Question Answer Comments   Is discharge order? Yes      Activity - Up with nursing assistance   Order Specific Question Answer Comments   Is discharge order? Yes      Activity - Ambulate in hallway   Order Comments: Every shift   Order Specific Question Answer Comments   Is discharge order? Yes      Weight bearing status   Order Comments: As tolerated left lower extremity     Continuous Passive Motion Machine   Order Comments: Start CPM Day of Surgery.  0 - 90 degrees. Advance as tolerated.     Discharge  Instructions   Order Comments: TOTAL KNEE ARTHROPLASTY POST OPERATIVE DISCHARGE INSTRUCTIONS    FOLLOW UP APPOINTMENT  You are scheduled for a post operative appointment with Dr. Jenkins approximately four weeks after surgery.     Your follow up appointments will be at the location that you regularly see Dr. Jenkins:    [facility]  [address] [place] [id number]  [phone]    Physical therapy:   A prescription for physical therapy will be provided at the time of discharge.      ACTIVITY  Weight bearing status:   You may bear weight on your operative extremity as tolerated, using assistive devices (walker, cane) as needed. As you begin to feel more comfortable ambulating, you may gradually transition from a walker to a cane. Eventually, you should wean from all assistive devices. Although we would like you to discontinue use of assistive devices as soon as possible, do not transition until you have worked with your physical therapist to achieve safe balance and comfort.     Continuous passive motion machine:   Perform CPM exercises for six to eight hours per day for the first four weeks after surgery. The CPM should be set at 0 degrees to flexion tolerance with a goal of 90 degrees. Advance the CPM settings aggressively in increments of 5 degrees every 30 minutes until goal is achieved.     Exercises:   Perform the following exercises at least three times per day for the first four weeks after surgery to prevent complications, such as blood clots in your legs:  1) Point and flex your feet  2) Move your ankle around in big circles  3) Wiggle your toes   Also, perform thigh muscle tightening exercises for 10 to 15 minutes at least three times per day for the first four weeks after surgery.    Athletic Activities:  Activities such as swimming, bicycling, jogging, running, and stop-and-go sports should be avoided until permitted by your  provider.    Driving:  Driving is not permitted until directed by your provider. Typically, driving is restricted for three to four weeks after right knee surgery and three weeks after left knee surgery. Under no circumstance are you permitted to drive while using narcotic pain medications.    Return to Work:  You may return to work when directed by your provider. Typically, patients with desk/sitting jobs can return to work within two weeks while patients with heavy labor jobs can return to work around three months after surgery.      COMFORT AND PAIN MANAGEMENT  Elevation:   During times of inactivity throughout the first two weeks after surgery, make an effort to decrease swelling by elevating your operative extremity. This is most effectively done by lying down and placing several pillows lengthwise under your thigh and calf to raise your toes above the level of your nose. To ensure that you knee remains in full extension, do not place pillows directly under your knee.     Icing:  An ice pack will be provided to control swelling and discomfort after surgery. Place a thin towel on your skin and apply the ice pack overtop. You may apply ice for 20 minutes as often as two times per hour.    Pain Medications:  You will be discharged with acetaminophen (Tylenol) and a narcotic medication for pain management after surgery. Acetaminophen can help to effectively manage pain when used as prescribed, however, do not exceed the maximum daily dose of 3000 mg. The narcotic pain medication should be reserved for severe, breakthrough pain. Take the narcotic medication as prescribed and use only as often as necessary.       ANTICOAGULATION  Take warfarin as prescribed for a total of four weeks after surgery. This medication will require weekly INR checks (INR goal 1.5-2.5).       WOUND CARE AND SHOWERING  Wound care:  Keep the dressing on, clean, and dry for the first five days after surgery. You may then remove the dressing  and replace it with fresh 4x4s and tubigrip (or ACE wrap). Your surgical incision was closed with a clear knotless suture and tails were left at the margins of the incision. These tails can be left in place until your follow up appointment. The steri strips (small white tape that is directly on the incision areas) should be left in place until they fall off or are removed at your first office visit.    Showering:  You may shower ten days after surgery provided your incision is intact and dry without drainage. If there is fluid at the incision site, cover the incision with a non-permeable plastic bag. You may allow water to run over the incision, but do not soak or submerge the incision. Do not scrub the incision.     Tub Bathing:  Tub bathing, swimming, or any other activities that cause your incision to be submerged should be avoided until allowed by your provider. Typically, patients are allowed to return to these activities four weeks after surgery.      CONTACTING YOUR PHYSICIAN:  You may experience symptoms that require follow-up before your scheduled appointment. Please contact Dr. Jenkins s office if you experience:  1) Pain that persists or worsens in the first few days after surgery  2) Excessive redness or drainage of cloudy or bloody material from the wounds (clear red tinted fluid and some mild drainage should be expected) or drainage of any kind five days after surgery  3) A temperature elevation greater than 101.5    4) Pain, swelling or redness in your calf  5) Numbness or weakness in your leg or foot      Regular business hours (Monday - Friday, 8am - 5pm):  [facility]: [phone]    If you have any other concerns during normal business hours, please contact Dr. Jenkins's nurse, Clover Reed RN, at [phone] during normal business hours 8 am - 5 pm (leave message as needed). If your concern is urgent, please page Clover Reed RN at [phone] and tolbert  in your 10 digit phone number followed by the # sign after the beep.     After hours and weekends:  [facility] on call Orthopedic resident: [phone]    If you have an emergent concern, contact the Emergency Department at the [facility] - [phone] - or [facility] - [phone] - [facility].     Full Code   Order Specific Question Answer Comments   Code status determined by: Discussion with patient/legal decision maker      Physical Therapy Adult Consult   Order Comments: Evaluate and treat as clinically indicated.    Reason:  Mobilization/strength after knee replacement     Occupational Therapy Adult Consult   Order Comments: Evaluate and treat as clinically indicated.    Reason:  ADLs after knee replacement     Fall precautions     Assign Questionnaire Series to Patient           Discussed with Dr. Jenkins on the date of discharge.     Heriberto Acosta MD  PGY-4  Orthopaedic Surgery  [phone]    Discharge summary updated by me to reflect changes in plan/mediciation.

## 2018-12-03 NOTE — PROGRESS NOTES
Nurse to nurse report given to Daysi BRADY at [facility].     Zo Gillespie, RN on 12/3/2018 at 12:30 PM

## 2018-12-03 NOTE — PLAN OF CARE
Problem: Patient Care Overview  Goal: Plan of Care/Patient Progress Review  Outcome: Improving    VS: VSS; alert and oriented x4   O2:   >90% on RA    Denies SOB   Output: Voiding spontaneously in bcs   Last BM: 12/2/18   Activity:   Ambulates to bsc with assist x1 and walker     CPM in use from 1530 to 2100    Skin: Intact with the exception of L knee incision    Pain: Moderate pain controlled with ice and prn dilaudid q4h    CMS: Denies numbness/tingling    Dressing:   L knee incision CDI    Ace wrap in use    Diet: Regular; tolerating well    LDA: L PIV SL WDL    Equipment: Walker, bsc    Additional Info: Pt calm and cooperative with nursing cares. Able to make needs known. Fall prevention education reinforced; bed alarm in use.

## 2018-12-03 NOTE — PROGRESS NOTES
Pt notes fair pain control  Frustrated this am re lack of coordination with pain med administration and therapy    No chest pain or SOB    VSS  Afebrile  Alert, pleasant, fully oriented  Lungs clear  CV rrr  Abd soft  L calf soft      BMP nl    Assessment      1. Left total knee arthroplasty. Pain control adequate when meds are administered prior to therapy  2. HTN, stable off amlodipine          Plan  Attempt to better  Co ordinate therapy with pain meds  Continue to hold amlodipine  Continue low dose dilaudid  Pt is medically stable for discharge to TCU     Discussed with daughter

## 2018-12-03 NOTE — PLAN OF CARE
Problem: Knee Arthroplasty (Total, Partial) (Adult)  Goal: Signs and Symptoms of Listed Potential Problems Will be Absent, Minimized or Managed (Knee Arthroplasty)  Signs and symptoms of listed potential problems will be absent, minimized or managed by discharge/transition of care (reference Knee Arthroplasty (Total, Partial) (Adult) CPG).   Outcome: Improving  Patient A & O x4. VS stable. O2 sats >90% RA. Lung sounds clear bilaterally. IS encouraged. Patient denies chest pain, SOB, lightheadedness, dizziness, tingling, numbness, nausea, and vomiting. Pt A1 with walker, WBAT on LLE. On regular diet and tolerating well. Bowel sounds active, passing gas, and last BM 12/2. Drinking well and voiding spontaneously without difficulties, has incontinence brief in place. PIV patent and saline locked. Patient able to wiggle toes. CMS intact. Dressing clean, dry, and intact. Pain is moderately controlled and patient taking dilaudid 2 mg q4 for pain, ice pack applied to LLE. PCD on BLE, bilateral heels elevated off bed. Patient demonstrates ability to use call light appropriately. Will continue to monitor patient.

## 2018-12-03 NOTE — PROGRESS NOTES
Orthopaedic Surgery Progress Note   December 3, 2018    Subjective: No acute events overnight. Pain well controlled. Tolerating diet. Voiding spontaneously. +Flatus. Denies fever or chills, CP, SOB, numbness or tingling, motor dysfunction or weakness.     Objective: /69 (BP Location: Right arm)  Pulse 86  Temp 98.7  F (37.1  C) (Oral)  Resp 16  Ht 1.524 m (5')  Wt 65 kg (143 lb 4.8 oz)  SpO2 95%  BMI 27.99 kg/m2    General: no acute distress  CV: palpable pulses  Resp: Nonlabored breathing  LLE: long leg soft dressing intact; clean and dry. ACe wrap without strikethrough. Small amount of dry drainage on lateral cast padding.         5/5 TA/GSC/EHL/FHL; SILT DP/SP/Ravin/Saph    Palpable dorsalis pedis pulse    Labs:  Hemoglobin   Date Value Ref Range Status   12/02/2018 12.3 11.7 - 15.7 g/dL Final         Assessment and Plan: Luis Roe is a 87 year old female s/p L primary TKA on 11/30 with Dr. Jenkins. Doing well post-operatively.      Ortho Primary  Activity: Up with assist until independent.  Weight bearing status: WBAT LLE  Pain management: Transition from IV to PO as tolerated.    Antibiotics: Ancef x 24 hours.  Diet: Begin with clear fluids and progress diet as tolerated.   DVT prophylaxis: Aspirin and mechanical while in the hospital, discharge on Aspirin 325mg BID x 4 weeks.  Imaging: PACU.  Labs: Hgb POD#1-2.  Dressings: Keep clean, dry and intact until follow up   Physical Therapy/Occupational Therapy: Eval and treat.  Consults: Hospitalist.  Follow-up: Clinic with Dr. Jenkins in 4 weeks  With left knee x-rays needed.   Disposition: ok for discharge to TCU once placement found    Heriberto Acosta MD  Orthopaedic Surgery Resident, PGY-4  Pager: [phone]     For questions about this patient during the day, please attempt to contact me at my pager ([phone]) prior to contacting the Orthopaedic Surgery resident on call. Thank you!

## 2018-12-03 NOTE — PROGRESS NOTES
A/Ox's 4. Bed alarm on for safety. Pt rated pain as tolerable. Dilaudid given for pain control. Dressing CDI. CMS intact. Tolerated regular diet. Denied any nausea, CP, SOB, lightheadedness or dizziness. Voiding without pain or difficulty. Pt has urgency when she needs to void. Passing flatus. Up with SBA to assist of one. CPM off overnight. Encouraged increased/continued IS use. Resting in bed at this time with call light in reach. Able to make needs known. Continue to monitor.

## 2018-12-03 NOTE — PLAN OF CARE
Problem: Patient Care Overview  Goal: Plan of Care/Patient Progress Review  Discharge Planner PT   Patient plan for discharge: TCU  Current status: pt SBA bed mobility, CGA sit<>stand to FWW with cues for technique, ambulates x100' with FWW CGA/SBA very slowly and pain limited. She participates in supine TKA LE exercise to increase strength. Remains limited by pain, but improved today overall. AAROM 0-15-85.  Barriers to return to prior living situation: weakness, reduced activity tolerance, reduced caregiver support, pain  Recommendations for discharge: TCU  Rationale for recommendations: Pt currently below baseline level of function and would benefit from ongoing therapy to address the above deficits in order to progress towards PLOF and promote IND mobility.       Entered by: Alee Sinclair 12/03/2018 11:39 AM

## 2018-12-03 NOTE — PROGRESS NOTES
Social Work Services Discharge Note     Patient Name:  Luis Roe     Anticipated Discharge Date:  December 3, 2018    Discharge Disposition: [facility]        Following MD:  QUAN Swanson, [phone]     Pre-Admission Screening (PAS) online form has been completed.  The Level of Care (LOC) is:   Confirmation Code is:  [id number]  Patient/caregiver informed of referral to [facility] for Pre-Admission Screening for skilled nursing facility (SNF) placement and to expect a phone call post discharge from SNF.     Additional Services/Equipment Arranged: Wheelchair to and and from daughters vehicle for transportation.    Patient / Family response to discharge plan:  Patient was apprehensive at first, she agreed to discharge plan after confirmation with Dr. Mcfarland.     Persons notified of above discharge plan: Patient, patient's daughter, Dr. Mcfarland, Fely Cardozo NP, Charge Nurse [address] [place] [address]     Staff Discharge Instructions: RN to [phone]     CTS Handoff completed: Yes     Medicare Notice of Rights provided to the patient/family: Yes

## 2018-12-03 NOTE — PLAN OF CARE
Problem: Patient Care Overview  Goal: Plan of Care/Patient Progress Review  Physical Therapy Discharge Summary    Reason for therapy discharge:    Discharged to transitional care facility.    Progress towards therapy goal(s). See goals on Care Plan in [facility] electronic health record for goal details.  Goals partially met.  Barriers to achieving goals:   limited tolerance for therapy and discharge from facility.    Therapy recommendation(s):    Continued therapy is recommended.  Rationale/Recommendations:  continue to progress functinoal mobility to promote IND towards PLOF.

## 2018-12-03 NOTE — PLAN OF CARE
Problem: Patient Care Overview  Goal: Plan of Care/Patient Progress Review  Discharge Planner OT   Patient plan for discharge: TCU  Current status: Pt able to complete transfers and mobility to/from bathroom with CGA using FWW; min A for toilet transfer. Pt completed ADLs standing at sink with CGA for balance after set-up.  Barriers to return to prior living situation: weakness, pain, impaired balance  Recommendations for discharge: TCU  Rationale for recommendations: to increase pt's (I) with ADL/IADLs       Entered by: Namita Bullock 12/03/2018 12:41 PM     Occupational Therapy Discharge Summary    Reason for therapy discharge:    Discharged to transitional care facility.    Progress towards therapy goal(s). See goals on Care Plan in [facility] electronic health record for goal details.  Goals partially met.  Barriers to achieving goals:   discharge from facility.    Therapy recommendation(s):    Continued therapy is recommended.  Rationale/Recommendations:  continued OT at TCU to increase pt's (I) with ADL/IADLs.

## 2018-12-03 NOTE — PROGRESS NOTES
Nurse to Nurse report given to JOSE ANTONIO Tavarez at [facility]. Discharge packet faxed to [facility] and a discharge packet also sent with patient's family. Patient received ride to [facility] by daughter. Transportation was called for patient. Patient has no questions or concerns.     Zo Gillespie RN on 12/3/2018 at 2:06 PM

## 2018-12-05 ENCOUNTER — TELEPHONE (OUTPATIENT)
Dept: ORTHOPEDICS | Facility: CLINIC | Age: 83
End: 2018-12-05

## 2018-12-05 NOTE — TELEPHONE ENCOUNTER
Health Call Center    Phone Message    May a detailed message be left on voicemail: yes    Reason for Call: Other: Crystal from [facility] in [place] called stating pt arrived with order for CPM machine but they do not have them there and therefore pt is not using CPM machine right now. They are wondering if they should rent one or where to get access to one. Please call back     Action Taken: Message routed to:  [facility] ([facility] Ortho     12/6/18 -  Called [facility] TCU.   Dr. Jenkins said that Luis doesn't need the CPMM.  She can work on ROM with P.T.

## 2018-12-19 ENCOUNTER — TELEPHONE (OUTPATIENT)
Dept: ORTHOPEDICS | Facility: CLINIC | Age: 83
End: 2018-12-19

## 2018-12-19 NOTE — TELEPHONE ENCOUNTER
[facility] Call Center    Phone Message    May a detailed message be left on voicemail: yes    Reason for Call: Other: Aide calling to ask how long pt needs to continue Ace wraps for, next f/u is 1/7, does pt need to keep the wraps until the appt? Please call to discuss     Action Taken: Message routed to:  [facility] ([facility] Ortho Clinic

## 2019-01-04 ENCOUNTER — TRANSFERRED RECORDS (OUTPATIENT)
Dept: HEALTH INFORMATION MANAGEMENT | Facility: CLINIC | Age: 84
End: 2019-01-04

## 2019-01-04 DIAGNOSIS — Z98.890 STATUS POST KNEE SURGERY: Primary | ICD-10-CM

## 2019-01-07 ENCOUNTER — OFFICE VISIT (OUTPATIENT)
Dept: ORTHOPEDICS | Facility: CLINIC | Age: 84
End: 2019-01-07
Payer: MEDICARE

## 2019-01-07 VITALS — BODY MASS INDEX: 28.49 KG/M2 | WEIGHT: 145.9 LBS

## 2019-01-07 DIAGNOSIS — Z96.652 S/P TOTAL KNEE ARTHROPLASTY, LEFT: ICD-10-CM

## 2019-01-07 DIAGNOSIS — Z09 POSTOPERATIVE FOLLOW-UP: ICD-10-CM

## 2019-01-07 DIAGNOSIS — R60.0 EDEMA LEG: Primary | ICD-10-CM

## 2019-01-07 ASSESSMENT — KOOS JR
HOW SEVERE IS YOUR KNEE STIFFNESS AFTER FIRST WAKING IN MORNING: 1
HOW SEVERE IS YOUR KNEE STIFFNESS AFTER FIRST WAKING IN MORNING: MILD

## 2019-01-07 ASSESSMENT — ACTIVITIES OF DAILY LIVING (ADL): FUNCTION,_DAILY_LIVING_SCORE: INCOMPLETE

## 2019-01-07 NOTE — LETTER
1/7/2019       RE: Luis Roe  [address] [facility] [place] [address]     Dear Colleague,    Thank you for referring your patient, Luis Roe, to the [facility]. Please see a copy of my visit note below.    [facility]  Orthopaedic Surgery, Joint Replacement Consultation  by Beto Jenkins M.D.    Luis Roe MRN# [id number]   Age: 87 year old YOB: 1931     Requesting physician: No ref. provider found  SethsincereDipak lugoi      Dx:  1. 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 s/p prior pelvic acetabular osteotomy reconstruction  3. Bilateral knee degenerative changes    Treatments:   1. 10/30/12 Right total hip arthroplasty & reconstruction of pelvis with femoral autogenous graft using internal fixation (Gregory)  Nhung Trident Tritanium cup 60mm, Size F elevated 36mm liner, Cemented Omnifit femoral stem size 5, 36mm -5 CoCr head  2. 2/2018: right knee injection (several months of relief)  3. 8/2018: left knee injection (minimal relief)  4. 11/30/2018, left total knee replacement, (Gregory)    Postoperative knee replacement follow-up clinic visit  Luis Roe is doing well after last surgery listed above.      Patient reports that she has slightly limited extension but very good flexion. She is using a walker per therapies request. She otherwise does not endorse any pain.     She notes a remote history of a tooth infection and is wondering if it is okay for her to return to the dentist.    EXAM:  Incision healing, clean and dry. Slight warmth.   Joint range of motion -3 to 115 degrees, pain free  Effusion: none  2 plus ankle edema   Gait: ambulating with a walker     Intraoperative cultures:  none     IMAGING:  Not obtained today     IMPRESSION:  Excellent outcome to date after knee replacement.     PLAN:    Continue range of motion exercises and stretching.    Quadriceps  strengthening exercises.    Compression stockings.     Discontinue use of her walker, if needed use a cane in her right hand.    May be weight bearing as tolerated.    Discontinue DVT prophylaxis meds (i.e., warfarin or ASA) if not required for any other reason.    Patient given instructions re: prophylactic antibiotic recommendations during dental work.    Next follow-up visit at 1 year after surgery or sooner as needed.       Beto Jenkins M.D.  [employer] Professor  Oncology and Adult Reconstructive Surgery  Dept Orthopaedic Surgery, [facility]  [phone] office  [email or web address]    Scribe Disclosure:   I, Frank Reyes, am serving as a scribe to document services personally performed by Beto Jenkins MD at this visit, based upon the provider's statements to me. All documentation has been reviewed by the aforementioned provider prior to being entered into the official medical record.     KOOS Knee Survey Assessment    Knee Outcome Survey ADL Scale (Prudencio, SAHGUFTA; Diana, L; Pratibha, RS; Santino, FH; JUAN Alex; 1998) 9/17/2018   Do you have swelling in your knee? Always   Do you feel grinding, hear clicking or any other type of noise when your knee moves? Sometimes   Does your knee catch or hang up when moving? Never   Can you straighten your knee fully? Often   Can you bend your knee fully? Sometimes   How severe is your knee joint stiffness after first wakening in the morning? Moderate   How severe is your knee stiffness after sitting, lying or resting LATER IN THE DAY? Moderate   How often do you experience knee pain? Daily   Twisting/pivoting on your knee Severe   Straightening knee fully Moderate   Bending knee fully Severe   Walking on flat surface Moderate   Going up or down stairs Severe   At night while in bed Mild   Sitting or lying Mild   Standing upright Severe   Descending stairs Severe   Ascending stairs Moderate   Rising from sitting Moderate   Standing Severe   Bending to  floor/ an object Moderate   Walking on flat surface Moderate   Getting in/out of car Severe   Going shopping Moderate   Putting on socks/stockings Severe   Rising from bed Moderate   Taking off socks/stockings Moderate   Lying in bed (turning over, maintaining knee position) Moderate   Getting in/out of bath Severe   Sitting Moderate   Getting on/off toilet Moderate   Heavy domestic duties (moving heavy boxes, scrubbing floors, etc) Extreme   Light domestic duties (cooking, dusting, etc) Moderate   Squatting Severe   Running Extreme   Jumping Extreme   Twisting/pivoting on your injured knee Extreme   Kneeling Extreme   How often are you aware of your knee problem? Constantly   Have you modified your life style to avoid potentially damaging activities to your knee? Totally   How much are you troubled with lack of confidence in your knee? Totally   In general, how much difficulty do you have with your knee? Severe   Pain Score 41.66   Symptoms Score 46.42   Function, Daily Living Score 39.7   Sports and Rec Score 5   Quality of Life Score 6.25

## 2019-01-07 NOTE — PROGRESS NOTES
[facility]  Orthopaedic Surgery, Joint Replacement Consultation  by Beto Jenkins M.D.    Luis Roe MRN# [id number]   Age: 87 year old YOB: 1931     Requesting physician: No ref. provider found  [place]      Dx:  1. 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 s/p prior pelvic acetabular osteotomy reconstruction  3. Bilateral knee degenerative changes    Treatments:   1. 10/30/12 Right total hip arthroplasty & reconstruction of pelvis with femoral autogenous graft using internal fixation (Gregory)  Catglobe Trident Tritanium cup 60mm, Size F elevated 36mm liner, Cemented Omnifit femoral stem size 5, 36mm -5 CoCr head  2. 2/2018: right knee injection (several months of relief)  3. 8/2018: left knee injection (minimal relief)  4. 11/30/2018, left total knee replacement, (Gregory)    Postoperative knee replacement follow-up clinic visit  Luis Roe is doing well after last surgery listed above.      Patient reports that she has slightly limited extension but very good flexion. She is using a walker per therapies request. She otherwise does not endorse any pain.     She notes a remote history of a tooth infection and is wondering if it is okay for her to return to the dentist.    EXAM:  Incision healing, clean and dry. Slight warmth.   Joint range of motion -3 to 115 degrees, pain free  Effusion: none  2 plus ankle edema   Gait: ambulating with a walker     Intraoperative cultures:  none     IMAGING:  Not obtained today     IMPRESSION:  Excellent outcome to date after knee replacement.     PLAN:    Continue range of motion exercises and stretching.    Quadriceps strengthening exercises.    Compression stockings.     Discontinue use of her walker, if needed use a cane in her right hand.    May be weight bearing as tolerated.    Discontinue DVT prophylaxis meds (i.e., warfarin or ASA) if not required for any other reason.    Patient given  instructions re: prophylactic antibiotic recommendations during dental work.    Next follow-up visit at 1 year after surgery or sooner as needed.       Beto Jenkins M.D.  [employer] Family Professor  Oncology and Adult Reconstructive Surgery  Dept Orthopaedic Surgery, [facility]  [phone] office  [email or web address]    Scribe Disclosure:   I, Frankbernadette Reyes, am serving as a scribe to document services personally performed by Beto Jenkins MD at this visit, based upon the provider's statements to me. All documentation has been reviewed by the aforementioned provider prior to being entered into the official medical record.     KOOS Knee Survey Assessment    Knee Outcome Survey ADL Scale (Prudencio, SHAGUFTA; Diana L; Pratibha, RS; Santino, FH; JUAN Alex; 1998) 9/17/2018   Do you have swelling in your knee? Always   Do you feel grinding, hear clicking or any other type of noise when your knee moves? Sometimes   Does your knee catch or hang up when moving? Never   Can you straighten your knee fully? Often   Can you bend your knee fully? Sometimes   How severe is your knee joint stiffness after first wakening in the morning? Moderate   How severe is your knee stiffness after sitting, lying or resting LATER IN THE DAY? Moderate   How often do you experience knee pain? Daily   Twisting/pivoting on your knee Severe   Straightening knee fully Moderate   Bending knee fully Severe   Walking on flat surface Moderate   Going up or down stairs Severe   At night while in bed Mild   Sitting or lying Mild   Standing upright Severe   Descending stairs Severe   Ascending stairs Moderate   Rising from sitting Moderate   Standing Severe   Bending to floor/ an object Moderate   Walking on flat surface Moderate   Getting in/out of car Severe   Going shopping Moderate   Putting on socks/stockings Severe   Rising from bed Moderate   Taking off socks/stockings Moderate   Lying in bed (turning over, maintaining knee  position) Moderate   Getting in/out of bath Severe   Sitting Moderate   Getting on/off toilet Moderate   Heavy domestic duties (moving heavy boxes, scrubbing floors, etc) Extreme   Light domestic duties (cooking, dusting, etc) Moderate   Squatting Severe   Running Extreme   Jumping Extreme   Twisting/pivoting on your injured knee Extreme   Kneeling Extreme   How often are you aware of your knee problem? Constantly   Have you modified your life style to avoid potentially damaging activities to your knee? Totally   How much are you troubled with lack of confidence in your knee? Totally   In general, how much difficulty do you have with your knee? Severe   Pain Score 41.66   Symptoms Score 46.42   Function, Daily Living Score 39.7   Sports and Rec Score 5   Quality of Life Score 6.25

## 2019-01-07 NOTE — NURSING NOTE
Chief Complaint   Patient presents with     Surgical Followup     F/u Left Total Knee Replacement DOS: 11/30/2018     RECHECK     Pt is wondering when she can return to the dentist. She states that she is supposed to be seen every 3 months for Infection recheck.        87 year old  8/7/1931    Wt 66.2 kg (145 lb 14.4 oz)   BMI 28.49 kg/m             [facility] [address] [place] [address] - [facility] [address] [address]      Allergies   Allergen Reactions     Ace Inhibitors      Other reaction(s): Cough     No Clinical Screening - See Comments      Dust and mold: Nasal        Current Outpatient Medications   Medication     acetaminophen (TYLENOL) 325 MG tablet     amLODIPine (NORVASC) 5 MG tablet     ascorbic acid 1000 MG TABS tablet     calcium carbonate (OS-ANGELINA 600 MG Mi'kmaq. CA) 600 MG tablet     cholecalciferol (VITAMIN D-1000 MAX ST) 1000 units TABS     latanoprost (XALATAN) 0.005 % ophthalmic solution     Multiple Vitamin (DAILY MULTIVITAMIN PO)     niacin 250 MG tablet     Omega-3 Fatty Acids (FISH OIL PO)     travoprost, FRANCISCO Free, (TRAVATAN Z) 0.004 % ophthalmic solution     amoxicillin (AMOXIL) 500 MG capsule     cetirizine (RA CETIRIZINE) 10 MG tablet     HYDROmorphone (DILAUDID) 2 MG tablet     senna-docusate (SENOKOT-S/PERICOLACE) 8.6-50 MG tablet     No current facility-administered medications for this visit.          Questionnaires:          KOOS Knee Survey Assessment    Knee Outcome Survey ADL Scale (SHAGUFTA Flannery; Diana, L; Pratibha, RS; Santino, FH; Isai, JUAN; 1998) 1/7/2019   Do you have swelling in your knee? Rarely   Do you feel grinding, hear clicking or any other type of noise when your knee moves? Never   Does your knee catch or hang up when moving? Never   Can you straighten your knee fully? Never   Can you bend your knee fully? Often   How severe is your knee joint stiffness after first wakening in the morning? Mild   How severe is your knee stiffness after  sitting, lying or resting LATER IN THE DAY? -   How often do you experience knee pain? -   Twisting/pivoting on your knee -   Straightening knee fully -   Bending knee fully -   Walking on flat surface -   Going up or down stairs -   At night while in bed -   Sitting or lying -   Standing upright -   Descending stairs -   Ascending stairs -   Rising from sitting -   Standing -   Bending to floor/ an object -   Walking on flat surface -   Getting in/out of car -   Going shopping -   Putting on socks/stockings -   Rising from bed -   Taking off socks/stockings -   Lying in bed (turning over, maintaining knee position) -   Getting in/out of bath -   Sitting -   Getting on/off toilet -   Heavy domestic duties (moving heavy boxes, scrubbing floors, etc) -   Light domestic duties (cooking, dusting, etc) -   Squatting -   Running -   Jumping -   Twisting/pivoting on your injured knee -   Kneeling -   How often are you aware of your knee problem? -   Have you modified your life style to avoid potentially damaging activities to your knee? -   How much are you troubled with lack of confidence in your knee? -   In general, how much difficulty do you have with your knee? -   Pain Score Incomplete   Symptoms Score Incomplete   Function, Daily Living Score Incomplete   Sports and Rec Score Incomplete   Quality of Life Score Incomplete              Promis 10 Assessment    PROMIS 10 9/17/2018   In general, would you say your health is: Good   In general, would you say your quality of life is: Fair   In general, how would you rate your physical health? Good   In general, how would you rate your mental health, including your mood and your ability to think? Very good   In general, how would you rate your satisfaction with your social activities and relationships? Good   In general, please rate how well you carry out your usual social activities and roles Good   To what extent are you able to carry out your everyday physical  activities such as walking, climbing stairs, carrying groceries, or moving a chair? Mostly   How often have you been bothered by emotional problems such as feeling anxious, depressed or irritable? Rarely   How would you rate your fatigue on average? Moderate   How would you rate your pain on average?   0 = No Pain  to  10 = Worst Imaginable Pain 5   In general, would you say your health is: 3   In general, would you say your quality of life is: 2   In general, how would you rate your physical health? 3   In general, how would you rate your mental health, including your mood and your ability to think? 4   In general, how would you rate your satisfaction with your social activities and relationships? 3   In general, please rate how well you carry out your usual social activities and roles. (This includes activities at home, at work and in your community, and responsibilities as a parent, child, spouse, employee, friend, etc.) 3   To what extent are you able to carry out your everyday physical activities such as walking, climbing stairs, carrying groceries, or moving a chair? 4   In the past 7 days, how often have you been bothered by emotional problems such as feeling anxious, depressed, or irritable? 2   In the past 7 days, how would you rate your fatigue on average? 3   In the past 7 days, how would you rate your pain on average, where 0 means no pain, and 10 means worst imaginable pain? 5   Global Mental Health Score 13   Global Physical Health Score 13   PROMIS TOTAL - SUBSCORES 26   Some recent data might be hidden

## 2019-08-19 DIAGNOSIS — M16.9 OA (OSTEOARTHRITIS) OF HIP: ICD-10-CM

## 2019-08-19 RX ORDER — AMOXICILLIN 500 MG/1
CAPSULE ORAL
Qty: 4 CAPSULE | Refills: 2 | Status: SHIPPED | OUTPATIENT
Start: 2019-08-19

## 2025-05-20 ENCOUNTER — LAB REQUISITION (OUTPATIENT)
Dept: LAB | Facility: CLINIC | Age: 89
End: 2025-05-20
Payer: MEDICARE

## 2025-05-20 DIAGNOSIS — I63.9 CEREBRAL INFARCTION, UNSPECIFIED (H): ICD-10-CM

## 2025-05-20 DIAGNOSIS — I10 ESSENTIAL (PRIMARY) HYPERTENSION: ICD-10-CM

## 2025-05-20 DIAGNOSIS — M81.0 AGE-RELATED OSTEOPOROSIS WITHOUT CURRENT PATHOLOGICAL FRACTURE: ICD-10-CM

## 2025-06-02 LAB
ALBUMIN SERPL BCG-MCNC: 3.6 G/DL (ref 3.5–5.2)
ALBUMIN SERPL BCG-MCNC: 3.6 G/DL (ref 3.5–5.2)
ALP SERPL-CCNC: 85 U/L (ref 40–150)
ALP SERPL-CCNC: 85 U/L (ref 40–150)
ALT SERPL W P-5'-P-CCNC: 10 U/L (ref 0–50)
ALT SERPL W P-5'-P-CCNC: 10 U/L (ref 0–50)
ANION GAP SERPL CALCULATED.3IONS-SCNC: 10 MMOL/L (ref 7–15)
ANION GAP SERPL CALCULATED.3IONS-SCNC: 10 MMOL/L (ref 7–15)
AST SERPL W P-5'-P-CCNC: 23 U/L (ref 0–45)
AST SERPL W P-5'-P-CCNC: 23 U/L (ref 0–45)
BILIRUB SERPL-MCNC: 0.5 MG/DL
BILIRUB SERPL-MCNC: 0.5 MG/DL
BUN SERPL-MCNC: 10.1 MG/DL (ref 8–23)
BUN SERPL-MCNC: 10.1 MG/DL (ref 8–23)
CALCIUM SERPL-MCNC: 9 MG/DL (ref 8.8–10.4)
CALCIUM SERPL-MCNC: 9 MG/DL (ref 8.8–10.4)
CHLORIDE SERPL-SCNC: 106 MMOL/L (ref 98–107)
CHLORIDE SERPL-SCNC: 106 MMOL/L (ref 98–107)
CHOLEST SERPL-MCNC: 144 MG/DL
CHOLEST SERPL-MCNC: 144 MG/DL
CREAT SERPL-MCNC: 0.58 MG/DL (ref 0.51–0.95)
CREAT SERPL-MCNC: 0.58 MG/DL (ref 0.51–0.95)
EGFRCR SERPLBLD CKD-EPI 2021: 84 ML/MIN/1.73M2
EGFRCR SERPLBLD CKD-EPI 2021: 84 ML/MIN/1.73M2
ERYTHROCYTE [DISTWIDTH] IN BLOOD BY AUTOMATED COUNT: 14.4 % (ref 10–15)
ERYTHROCYTE [DISTWIDTH] IN BLOOD BY AUTOMATED COUNT: 14.4 % (ref 10–15)
FASTING STATUS PATIENT QL REPORTED: ABNORMAL
FASTING STATUS PATIENT QL REPORTED: ABNORMAL
GLUCOSE SERPL-MCNC: 90 MG/DL (ref 70–99)
GLUCOSE SERPL-MCNC: 90 MG/DL (ref 70–99)
HCO3 SERPL-SCNC: 27 MMOL/L (ref 22–29)
HCO3 SERPL-SCNC: 27 MMOL/L (ref 22–29)
HCT VFR BLD AUTO: 43.4 % (ref 35–47)
HCT VFR BLD AUTO: 43.4 % (ref 35–47)
HDLC SERPL-MCNC: 48 MG/DL
HDLC SERPL-MCNC: 48 MG/DL
HGB BLD-MCNC: 13.2 G/DL (ref 11.7–15.7)
HGB BLD-MCNC: 13.2 G/DL (ref 11.7–15.7)
LDLC SERPL CALC-MCNC: 80 MG/DL
LDLC SERPL CALC-MCNC: 80 MG/DL
MCH RBC QN AUTO: 29.1 PG (ref 26.5–33)
MCH RBC QN AUTO: 29.1 PG (ref 26.5–33)
MCHC RBC AUTO-ENTMCNC: 30.4 G/DL (ref 31.5–36.5)
MCHC RBC AUTO-ENTMCNC: 30.4 G/DL (ref 31.5–36.5)
MCV RBC AUTO: 96 FL (ref 78–100)
MCV RBC AUTO: 96 FL (ref 78–100)
NONHDLC SERPL-MCNC: 96 MG/DL
NONHDLC SERPL-MCNC: 96 MG/DL
PLATELET # BLD AUTO: 239 10E3/UL (ref 150–450)
PLATELET # BLD AUTO: 239 10E3/UL (ref 150–450)
POTASSIUM SERPL-SCNC: 3.8 MMOL/L (ref 3.4–5.3)
POTASSIUM SERPL-SCNC: 3.8 MMOL/L (ref 3.4–5.3)
PROT SERPL-MCNC: 7.1 G/DL (ref 6.4–8.3)
PROT SERPL-MCNC: 7.1 G/DL (ref 6.4–8.3)
RBC # BLD AUTO: 4.54 10E6/UL (ref 3.8–5.2)
RBC # BLD AUTO: 4.54 10E6/UL (ref 3.8–5.2)
SODIUM SERPL-SCNC: 143 MMOL/L (ref 135–145)
SODIUM SERPL-SCNC: 143 MMOL/L (ref 135–145)
TRIGL SERPL-MCNC: 78 MG/DL
TRIGL SERPL-MCNC: 78 MG/DL
VIT D+METAB SERPL-MCNC: 24 NG/ML (ref 20–50)
VIT D+METAB SERPL-MCNC: 24 NG/ML (ref 20–50)
WBC # BLD AUTO: 6.6 10E3/UL (ref 4–11)
WBC # BLD AUTO: 6.6 10E3/UL (ref 4–11)

## 2025-06-02 PROCEDURE — 36415 COLL VENOUS BLD VENIPUNCTURE: CPT | Mod: ORL | Performed by: PHYSICIAN ASSISTANT

## 2025-06-02 PROCEDURE — 85027 COMPLETE CBC AUTOMATED: CPT | Mod: ORL | Performed by: PHYSICIAN ASSISTANT

## 2025-06-02 PROCEDURE — P9604 ONE-WAY ALLOW PRORATED TRIP: HCPCS | Mod: ORL | Performed by: PHYSICIAN ASSISTANT

## 2025-06-02 PROCEDURE — 80061 LIPID PANEL: CPT | Mod: ORL | Performed by: PHYSICIAN ASSISTANT

## 2025-06-02 PROCEDURE — 80053 COMPREHEN METABOLIC PANEL: CPT | Mod: ORL | Performed by: PHYSICIAN ASSISTANT

## 2025-06-02 PROCEDURE — 82306 VITAMIN D 25 HYDROXY: CPT | Mod: ORL | Performed by: PHYSICIAN ASSISTANT

## (undated) DEVICE — LINEN BACK PACK 5440

## (undated) DEVICE — WIPES FOLEY CARE SURESTEP PROVON DFC100

## (undated) DEVICE — DRAIN JACKSON PRATT RESERVOIR 400ML SU130-1000

## (undated) DEVICE — SU VICRYL 2-0 CT-1 27" UND J259H

## (undated) DEVICE — TOURNIQUET CUFF 30" REPRO BLUE 60-7070-105

## (undated) DEVICE — SU ETHIBOND 1 CT-1 30" X425H

## (undated) DEVICE — SOL NACL 0.9% INJ 1000ML BAG 2B1324X

## (undated) DEVICE — DRAPE XRAY CASSETTE 20X23

## (undated) DEVICE — BLADE SAW SAGITTAL STRK 25X79.5X1.24MM 4/2000 2108-318-000

## (undated) DEVICE — SPONGE LAP 18X18" X8435

## (undated) DEVICE — BONE CLEANING TIP INTERPULSE  0210-010-000

## (undated) DEVICE — ESU GROUND PAD UNIVERSAL W/O CORD

## (undated) DEVICE — SU VICRYL 2-0 CT 36" J957H

## (undated) DEVICE — CATH TRAY FOLEY SURESTEP 16FR WDRAIN BAG STLK LATEX A300316A

## (undated) DEVICE — BONE CEMENT MIXEVAC III HI VAC KIT  0206-015-000

## (undated) DEVICE — SU PDS II 3-0 PS-1 18" Z683G

## (undated) DEVICE — SUCTION MANIFOLD DORNOCH ULTRA CART UL-CL500

## (undated) DEVICE — GLOVE PROTEXIS W/NEU-THERA 7.0  2D73TE70

## (undated) DEVICE — GLOVE PROTEXIS BLUE W/NEU-THERA 8.5  2D73EB85

## (undated) DEVICE — GLOVE PROTEXIS BLUE W/NEU-THERA 7.5  2D73EB75

## (undated) DEVICE — LINEN GOWN X4 5410

## (undated) DEVICE — PREP DURAPREP 26ML APL 8630

## (undated) DEVICE — TAPE CLOTH 3" CARDINAL 3TRCL03

## (undated) DEVICE — SUCTION IRR SYSTEM W/O TIP INTERPULSE HANDPIECE 0210-100-000

## (undated) DEVICE — STRAP KNEE/BODY 31143004

## (undated) DEVICE — CAST PADDING 6" STERILE 9046S

## (undated) DEVICE — SU ETHIBOND 1 CTX CR 8/18" CX30D

## (undated) DEVICE — PREP POVIDONE IODINE SCRUB 7.5% 120ML

## (undated) DEVICE — GLOVE PROTEXIS W/NEU-THERA 8.0  2D73TE80

## (undated) DEVICE — DRSG TEGADERM 4X4 3/4" 1626W

## (undated) DEVICE — DRAIN JACKSON PRATT 15FR ROUND SU130-1323

## (undated) DEVICE — DRSG STERI STRIP 1/2X4" R1547

## (undated) DEVICE — PREP SKIN SCRUB TRAY 4461A

## (undated) DEVICE — BASIN SET MAJOR

## (undated) DEVICE — BLADE KNIFE SURG 15 371115

## (undated) DEVICE — Device

## (undated) DEVICE — DRAPE STOCKINETTE 8" 8586

## (undated) DEVICE — DRSG DRAIN 4X4" 7086

## (undated) DEVICE — LINEN GOWN OVERSIZE 5408

## (undated) DEVICE — SU VICRYL 0 CT-1 36" J946H

## (undated) DEVICE — SOL WATER IRRIG 1000ML BOTTLE 2F7114

## (undated) DEVICE — GLOVE PROTEXIS BLUE W/NEU-THERA 8.0  2D73EB80

## (undated) DEVICE — GOWN IMPERVIOUS SPECIALTY XLG/XLONG 32474

## (undated) RX ORDER — FENTANYL CITRATE 50 UG/ML
INJECTION, SOLUTION INTRAMUSCULAR; INTRAVENOUS
Status: DISPENSED
Start: 2018-11-30

## (undated) RX ORDER — LIDOCAINE HYDROCHLORIDE 20 MG/ML
INJECTION, SOLUTION EPIDURAL; INFILTRATION; INTRACAUDAL; PERINEURAL
Status: DISPENSED
Start: 2018-11-30

## (undated) RX ORDER — KETOROLAC TROMETHAMINE 30 MG/ML
INJECTION, SOLUTION INTRAMUSCULAR; INTRAVENOUS
Status: DISPENSED
Start: 2018-11-30

## (undated) RX ORDER — HYDROMORPHONE HYDROCHLORIDE 1 MG/ML
INJECTION, SOLUTION INTRAMUSCULAR; INTRAVENOUS; SUBCUTANEOUS
Status: DISPENSED
Start: 2018-11-30

## (undated) RX ORDER — CEFAZOLIN SODIUM 2 G/100ML
INJECTION, SOLUTION INTRAVENOUS
Status: DISPENSED
Start: 2018-11-30

## (undated) RX ORDER — ACETAMINOPHEN 325 MG/1
TABLET ORAL
Status: DISPENSED
Start: 2018-11-30

## (undated) RX ORDER — PROPOFOL 10 MG/ML
INJECTION, EMULSION INTRAVENOUS
Status: DISPENSED
Start: 2018-11-30

## (undated) RX ORDER — ONDANSETRON 2 MG/ML
INJECTION INTRAMUSCULAR; INTRAVENOUS
Status: DISPENSED
Start: 2018-11-30